# Patient Record
Sex: FEMALE | Race: WHITE | NOT HISPANIC OR LATINO | Employment: FULL TIME | URBAN - METROPOLITAN AREA
[De-identification: names, ages, dates, MRNs, and addresses within clinical notes are randomized per-mention and may not be internally consistent; named-entity substitution may affect disease eponyms.]

---

## 2018-09-01 ENCOUNTER — APPOINTMENT (INPATIENT)
Dept: NON INVASIVE DIAGNOSTICS | Facility: HOSPITAL | Age: 53
DRG: 247 | End: 2018-09-01
Attending: EMERGENCY MEDICINE
Payer: COMMERCIAL

## 2018-09-01 ENCOUNTER — HOSPITAL ENCOUNTER (INPATIENT)
Facility: HOSPITAL | Age: 53
LOS: 2 days | Discharge: HOME | DRG: 247 | End: 2018-09-03
Attending: EMERGENCY MEDICINE | Admitting: INTERNAL MEDICINE
Payer: COMMERCIAL

## 2018-09-01 ENCOUNTER — APPOINTMENT (EMERGENCY)
Dept: RADIOLOGY | Facility: HOSPITAL | Age: 53
DRG: 247 | End: 2018-09-01
Payer: COMMERCIAL

## 2018-09-01 DIAGNOSIS — I21.4 NSTEMI (NON-ST ELEVATED MYOCARDIAL INFARCTION) (HCC): Primary | ICD-10-CM

## 2018-09-01 DIAGNOSIS — I24.9 ACUTE CORONARY SYNDROME (HCC): ICD-10-CM

## 2018-09-01 LAB
ALBUMIN SERPL BCP-MCNC: 4 G/DL (ref 3.5–5)
ALP SERPL-CCNC: 90 U/L (ref 46–116)
ALT SERPL W P-5'-P-CCNC: 22 U/L (ref 12–78)
ANION GAP SERPL CALCULATED.3IONS-SCNC: 9 MMOL/L (ref 4–13)
APTT PPP: 26 SECONDS (ref 24–36)
AST SERPL W P-5'-P-CCNC: 20 U/L (ref 5–45)
ATRIAL RATE: 83 BPM
ATRIAL RATE: 84 BPM
ATRIAL RATE: 85 BPM
BASOPHILS # BLD AUTO: 0.06 THOUSANDS/ΜL (ref 0–0.1)
BASOPHILS NFR BLD AUTO: 1 % (ref 0–1)
BILIRUB SERPL-MCNC: 0.42 MG/DL (ref 0.2–1)
BUN SERPL-MCNC: 7 MG/DL (ref 5–25)
CALCIUM SERPL-MCNC: 9.4 MG/DL (ref 8.3–10.1)
CHLORIDE SERPL-SCNC: 101 MMOL/L (ref 100–108)
CHOLEST SERPL-MCNC: 296 MG/DL (ref 50–200)
CO2 SERPL-SCNC: 27 MMOL/L (ref 21–32)
CREAT SERPL-MCNC: 0.84 MG/DL (ref 0.6–1.3)
EOSINOPHIL # BLD AUTO: 0.17 THOUSAND/ΜL (ref 0–0.61)
EOSINOPHIL NFR BLD AUTO: 2 % (ref 0–6)
ERYTHROCYTE [DISTWIDTH] IN BLOOD BY AUTOMATED COUNT: 13.3 % (ref 11.6–15.1)
GFR SERPL CREATININE-BSD FRML MDRD: 81 ML/MIN/1.73SQ M
GLUCOSE SERPL-MCNC: 138 MG/DL (ref 65–140)
HCT VFR BLD AUTO: 46.3 % (ref 34.8–46.1)
HDLC SERPL-MCNC: 52 MG/DL (ref 40–60)
HGB BLD-MCNC: 15.6 G/DL (ref 11.5–15.4)
IMM GRANULOCYTES # BLD AUTO: 0.03 THOUSAND/UL (ref 0–0.2)
IMM GRANULOCYTES NFR BLD AUTO: 0 % (ref 0–2)
INR PPP: 0.99 (ref 0.86–1.17)
LDLC SERPL CALC-MCNC: 178 MG/DL (ref 0–100)
LYMPHOCYTES # BLD AUTO: 2.45 THOUSANDS/ΜL (ref 0.6–4.47)
LYMPHOCYTES NFR BLD AUTO: 26 % (ref 14–44)
MAGNESIUM SERPL-MCNC: 2.1 MG/DL (ref 1.6–2.6)
MCH RBC QN AUTO: 33.6 PG (ref 26.8–34.3)
MCHC RBC AUTO-ENTMCNC: 33.7 G/DL (ref 31.4–37.4)
MCV RBC AUTO: 100 FL (ref 82–98)
MONOCYTES # BLD AUTO: 0.55 THOUSAND/ΜL (ref 0.17–1.22)
MONOCYTES NFR BLD AUTO: 6 % (ref 4–12)
NEUTROPHILS # BLD AUTO: 6.28 THOUSANDS/ΜL (ref 1.85–7.62)
NEUTS SEG NFR BLD AUTO: 65 % (ref 43–75)
NRBC BLD AUTO-RTO: 0 /100 WBCS
P AXIS: 60 DEGREES
P AXIS: 68 DEGREES
P AXIS: 74 DEGREES
PLATELET # BLD AUTO: 206 THOUSANDS/UL (ref 149–390)
PMV BLD AUTO: 9.9 FL (ref 8.9–12.7)
POTASSIUM SERPL-SCNC: 3.8 MMOL/L (ref 3.5–5.3)
PR INTERVAL: 140 MS
PR INTERVAL: 142 MS
PR INTERVAL: 154 MS
PROT SERPL-MCNC: 8.1 G/DL (ref 6.4–8.2)
PROTHROMBIN TIME: 13.2 SECONDS (ref 11.8–14.2)
QRS AXIS: 76 DEGREES
QRS AXIS: 79 DEGREES
QRS AXIS: 80 DEGREES
QRSD INTERVAL: 68 MS
QRSD INTERVAL: 70 MS
QRSD INTERVAL: 75 MS
QT INTERVAL: 348 MS
QT INTERVAL: 352 MS
QT INTERVAL: 442 MS
QTC INTERVAL: 408 MS
QTC INTERVAL: 415 MS
QTC INTERVAL: 526 MS
RBC # BLD AUTO: 4.64 MILLION/UL (ref 3.81–5.12)
SODIUM SERPL-SCNC: 137 MMOL/L (ref 136–145)
T WAVE AXIS: 73 DEGREES
T WAVE AXIS: 78 DEGREES
T WAVE AXIS: 86 DEGREES
TRIGL SERPL-MCNC: 328 MG/DL
TROPONIN I SERPL-MCNC: 0.16 NG/ML
VENTRICULAR RATE: 83 BPM
VENTRICULAR RATE: 84 BPM
VENTRICULAR RATE: 85 BPM
WBC # BLD AUTO: 9.54 THOUSAND/UL (ref 4.31–10.16)

## 2018-09-01 PROCEDURE — 93454 CORONARY ARTERY ANGIO S&I: CPT | Performed by: INTERNAL MEDICINE

## 2018-09-01 PROCEDURE — C1894 INTRO/SHEATH, NON-LASER: HCPCS | Performed by: EMERGENCY MEDICINE

## 2018-09-01 PROCEDURE — C1887 CATHETER, GUIDING: HCPCS | Performed by: EMERGENCY MEDICINE

## 2018-09-01 PROCEDURE — 93454 CORONARY ARTERY ANGIO S&I: CPT | Performed by: EMERGENCY MEDICINE

## 2018-09-01 PROCEDURE — C1874 STENT, COATED/COV W/DEL SYS: HCPCS

## 2018-09-01 PROCEDURE — 99153 MOD SED SAME PHYS/QHP EA: CPT | Performed by: EMERGENCY MEDICINE

## 2018-09-01 PROCEDURE — 99285 EMERGENCY DEPT VISIT HI MDM: CPT

## 2018-09-01 PROCEDURE — 99223 1ST HOSP IP/OBS HIGH 75: CPT | Performed by: INTERNAL MEDICINE

## 2018-09-01 PROCEDURE — C1769 GUIDE WIRE: HCPCS | Performed by: EMERGENCY MEDICINE

## 2018-09-01 PROCEDURE — 84484 ASSAY OF TROPONIN QUANT: CPT | Performed by: EMERGENCY MEDICINE

## 2018-09-01 PROCEDURE — 85730 THROMBOPLASTIN TIME PARTIAL: CPT | Performed by: PHYSICIAN ASSISTANT

## 2018-09-01 PROCEDURE — 93005 ELECTROCARDIOGRAM TRACING: CPT

## 2018-09-01 PROCEDURE — 93010 ELECTROCARDIOGRAM REPORT: CPT | Performed by: INTERNAL MEDICINE

## 2018-09-01 PROCEDURE — 80061 LIPID PANEL: CPT | Performed by: INTERNAL MEDICINE

## 2018-09-01 PROCEDURE — C1760 CLOSURE DEV, VASC: HCPCS | Performed by: EMERGENCY MEDICINE

## 2018-09-01 PROCEDURE — 71046 X-RAY EXAM CHEST 2 VIEWS: CPT

## 2018-09-01 PROCEDURE — 99152 MOD SED SAME PHYS/QHP 5/>YRS: CPT | Performed by: EMERGENCY MEDICINE

## 2018-09-01 PROCEDURE — C9606 PERC D-E COR REVASC W AMI S: HCPCS | Performed by: EMERGENCY MEDICINE

## 2018-09-01 PROCEDURE — 027035Z DILATION OF CORONARY ARTERY, ONE ARTERY WITH TWO DRUG-ELUTING INTRALUMINAL DEVICES, PERCUTANEOUS APPROACH: ICD-10-PCS | Performed by: INTERNAL MEDICINE

## 2018-09-01 PROCEDURE — 36415 COLL VENOUS BLD VENIPUNCTURE: CPT | Performed by: EMERGENCY MEDICINE

## 2018-09-01 PROCEDURE — B2111ZZ FLUOROSCOPY OF MULTIPLE CORONARY ARTERIES USING LOW OSMOLAR CONTRAST: ICD-10-PCS | Performed by: INTERNAL MEDICINE

## 2018-09-01 PROCEDURE — 83735 ASSAY OF MAGNESIUM: CPT | Performed by: EMERGENCY MEDICINE

## 2018-09-01 PROCEDURE — C1725 CATH, TRANSLUMIN NON-LASER: HCPCS | Performed by: EMERGENCY MEDICINE

## 2018-09-01 PROCEDURE — 80053 COMPREHEN METABOLIC PANEL: CPT | Performed by: EMERGENCY MEDICINE

## 2018-09-01 PROCEDURE — 92928 PRQ TCAT PLMT NTRAC ST 1 LES: CPT | Performed by: INTERNAL MEDICINE

## 2018-09-01 PROCEDURE — 96374 THER/PROPH/DIAG INJ IV PUSH: CPT

## 2018-09-01 PROCEDURE — 85025 COMPLETE CBC W/AUTO DIFF WBC: CPT | Performed by: EMERGENCY MEDICINE

## 2018-09-01 PROCEDURE — 99152 MOD SED SAME PHYS/QHP 5/>YRS: CPT | Performed by: INTERNAL MEDICINE

## 2018-09-01 PROCEDURE — 85610 PROTHROMBIN TIME: CPT | Performed by: PHYSICIAN ASSISTANT

## 2018-09-01 RX ORDER — LIDOCAINE HYDROCHLORIDE 10 MG/ML
INJECTION, SOLUTION INFILTRATION; PERINEURAL CODE/TRAUMA/SEDATION MEDICATION
Status: COMPLETED | OUTPATIENT
Start: 2018-09-01 | End: 2018-09-01

## 2018-09-01 RX ORDER — HEPARIN SODIUM 1000 [USP'U]/ML
4000 INJECTION, SOLUTION INTRAVENOUS; SUBCUTANEOUS ONCE
Status: COMPLETED | OUTPATIENT
Start: 2018-09-01 | End: 2018-09-01

## 2018-09-01 RX ORDER — ASPIRIN 81 MG/1
324 TABLET, CHEWABLE ORAL ONCE
Status: COMPLETED | OUTPATIENT
Start: 2018-09-01 | End: 2018-09-01

## 2018-09-01 RX ORDER — NITROGLYCERIN 0.4 MG/1
0.4 TABLET SUBLINGUAL ONCE
Status: COMPLETED | OUTPATIENT
Start: 2018-09-01 | End: 2018-09-01

## 2018-09-01 RX ORDER — SODIUM CHLORIDE 9 MG/ML
INJECTION, SOLUTION INTRAVENOUS
Status: COMPLETED | OUTPATIENT
Start: 2018-09-01 | End: 2018-09-01

## 2018-09-01 RX ORDER — ONDANSETRON 2 MG/ML
INJECTION INTRAMUSCULAR; INTRAVENOUS
Status: COMPLETED
Start: 2018-09-01 | End: 2018-09-01

## 2018-09-01 RX ORDER — NITROGLYCERIN 20 MG/100ML
10 INJECTION INTRAVENOUS
Status: DISCONTINUED | OUTPATIENT
Start: 2018-09-01 | End: 2018-09-01

## 2018-09-01 RX ORDER — MIDAZOLAM HYDROCHLORIDE 1 MG/ML
INJECTION INTRAMUSCULAR; INTRAVENOUS CODE/TRAUMA/SEDATION MEDICATION
Status: COMPLETED | OUTPATIENT
Start: 2018-09-01 | End: 2018-09-01

## 2018-09-01 RX ORDER — ACETAMINOPHEN 325 MG/1
650 TABLET ORAL EVERY 6 HOURS PRN
Status: DISCONTINUED | OUTPATIENT
Start: 2018-09-01 | End: 2018-09-03 | Stop reason: HOSPADM

## 2018-09-01 RX ORDER — SODIUM CHLORIDE 9 MG/ML
125 INJECTION, SOLUTION INTRAVENOUS CONTINUOUS
Status: DISPENSED | OUTPATIENT
Start: 2018-09-01 | End: 2018-09-01

## 2018-09-01 RX ORDER — 0.9 % SODIUM CHLORIDE 0.9 %
3 VIAL (ML) INJECTION AS NEEDED
Status: DISCONTINUED | OUTPATIENT
Start: 2018-09-01 | End: 2018-09-03 | Stop reason: HOSPADM

## 2018-09-01 RX ORDER — NICOTINE 21 MG/24HR
21 PATCH, TRANSDERMAL 24 HOURS TRANSDERMAL DAILY
Status: DISCONTINUED | OUTPATIENT
Start: 2018-09-01 | End: 2018-09-03 | Stop reason: HOSPADM

## 2018-09-01 RX ORDER — NITROGLYCERIN 20 MG/100ML
INJECTION INTRAVENOUS CODE/TRAUMA/SEDATION MEDICATION
Status: COMPLETED | OUTPATIENT
Start: 2018-09-01 | End: 2018-09-01

## 2018-09-01 RX ORDER — ASPIRIN 81 MG/1
81 TABLET, CHEWABLE ORAL DAILY
Status: DISCONTINUED | OUTPATIENT
Start: 2018-09-02 | End: 2018-09-03 | Stop reason: HOSPADM

## 2018-09-01 RX ORDER — ONDANSETRON 2 MG/ML
4 INJECTION INTRAMUSCULAR; INTRAVENOUS ONCE
Status: COMPLETED | OUTPATIENT
Start: 2018-09-01 | End: 2018-09-01

## 2018-09-01 RX ORDER — FENTANYL CITRATE 50 UG/ML
INJECTION, SOLUTION INTRAMUSCULAR; INTRAVENOUS CODE/TRAUMA/SEDATION MEDICATION
Status: COMPLETED | OUTPATIENT
Start: 2018-09-01 | End: 2018-09-01

## 2018-09-01 RX ORDER — ATORVASTATIN CALCIUM 80 MG/1
80 TABLET, FILM COATED ORAL
Status: DISCONTINUED | OUTPATIENT
Start: 2018-09-01 | End: 2018-09-03

## 2018-09-01 RX ORDER — ONDANSETRON 2 MG/ML
4 INJECTION INTRAMUSCULAR; INTRAVENOUS EVERY 4 HOURS PRN
Status: DISCONTINUED | OUTPATIENT
Start: 2018-09-01 | End: 2018-09-03 | Stop reason: HOSPADM

## 2018-09-01 RX ADMIN — FENTANYL CITRATE 50 MCG: 50 INJECTION, SOLUTION INTRAMUSCULAR; INTRAVENOUS at 10:46

## 2018-09-01 RX ADMIN — LIDOCAINE HYDROCHLORIDE 10 ML: 10 INJECTION, SOLUTION INFILTRATION; PERINEURAL at 10:51

## 2018-09-01 RX ADMIN — NITROGLYCERIN 200 MCG: 20 INJECTION INTRAVENOUS at 11:15

## 2018-09-01 RX ADMIN — NITROGLYCERIN 0.4 MG: 0.4 TABLET SUBLINGUAL at 09:19

## 2018-09-01 RX ADMIN — Medication 1.75 MG/KG/HR: at 10:59

## 2018-09-01 RX ADMIN — NICOTINE 21 MG: 21 PATCH, EXTENDED RELEASE TRANSDERMAL at 13:01

## 2018-09-01 RX ADMIN — NITROGLYCERIN 200 MCG: 20 INJECTION INTRAVENOUS at 11:08

## 2018-09-01 RX ADMIN — ONDANSETRON 4 MG: 2 INJECTION INTRAMUSCULAR; INTRAVENOUS at 09:13

## 2018-09-01 RX ADMIN — MIDAZOLAM 1 MG: 1 INJECTION INTRAMUSCULAR; INTRAVENOUS at 11:10

## 2018-09-01 RX ADMIN — SODIUM CHLORIDE 125 ML/HR: 0.9 INJECTION, SOLUTION INTRAVENOUS at 13:01

## 2018-09-01 RX ADMIN — ONDANSETRON 4 MG: 2 INJECTION INTRAMUSCULAR; INTRAVENOUS at 21:31

## 2018-09-01 RX ADMIN — MIDAZOLAM 1 MG: 1 INJECTION INTRAMUSCULAR; INTRAVENOUS at 11:16

## 2018-09-01 RX ADMIN — METOPROLOL TARTRATE 25 MG: 25 TABLET ORAL at 13:01

## 2018-09-01 RX ADMIN — HEPARIN SODIUM 4000 UNITS: 1000 INJECTION, SOLUTION INTRAVENOUS; SUBCUTANEOUS at 10:04

## 2018-09-01 RX ADMIN — NITROGLYCERIN 0.4 MG: 0.4 TABLET SUBLINGUAL at 09:32

## 2018-09-01 RX ADMIN — METOPROLOL TARTRATE 25 MG: 25 TABLET ORAL at 21:31

## 2018-09-01 RX ADMIN — FENTANYL CITRATE 25 MCG: 50 INJECTION, SOLUTION INTRAMUSCULAR; INTRAVENOUS at 11:10

## 2018-09-01 RX ADMIN — IOHEXOL 200 ML: 350 INJECTION, SOLUTION INTRAVENOUS at 11:28

## 2018-09-01 RX ADMIN — NITROGLYCERIN 10 MCG/MIN: 20 INJECTION INTRAVENOUS at 10:09

## 2018-09-01 RX ADMIN — ASPIRIN 81 MG 324 MG: 81 TABLET ORAL at 09:16

## 2018-09-01 RX ADMIN — ACETAMINOPHEN 650 MG: 325 TABLET, FILM COATED ORAL at 15:39

## 2018-09-01 RX ADMIN — TICAGRELOR 180 MG: 90 TABLET ORAL at 10:02

## 2018-09-01 RX ADMIN — FENTANYL CITRATE 25 MCG: 50 INJECTION, SOLUTION INTRAMUSCULAR; INTRAVENOUS at 11:15

## 2018-09-01 RX ADMIN — MIDAZOLAM 2 MG: 1 INJECTION INTRAMUSCULAR; INTRAVENOUS at 10:46

## 2018-09-01 RX ADMIN — ATORVASTATIN CALCIUM 80 MG: 40 TABLET, FILM COATED ORAL at 15:39

## 2018-09-01 RX ADMIN — SODIUM CHLORIDE 100 ML/HR: 0.9 INJECTION, SOLUTION INTRAVENOUS at 10:52

## 2018-09-01 NOTE — ED NOTES
Pt's pain 5/10 post nitro administration  Pt's oxygen saturation also dropped to 90% on RA and placed on 2L NC and returned to 96%  Kandy Denton PA-C made aware and that pt placed on 2L NC and VO from Sergio Dawson if pt returns to 100% may remove oxygen         Lopez Crawley RN  09/01/18 8314

## 2018-09-01 NOTE — H&P
Cardiology history and Physical  [unfilled]  [phone]    09/01/18    Referring Physian: Dwight Gustafson   SLIM    Chief Complain/Reason for Referal:     IMPRESSION:    1  Acute non ST elevation myocardial infarction with ongoing chest pain  2  Tobacco abuse  3  Alcohol abuse  DISCUSSION/RECOMMENDATIONS:    Discussed with cath lab-given ongoing pain and subtle ST elevations would proceed with urgent cardiac catheterization  Brilinta load and IV heparin  IV nitroglycerin  Discussed with patient and partner risks including risk of bleeding, infection, nerve damage, ventricular arrhythmias, stroke, heart attack, death, dissection, and dye reaction  Patient and partner wish to proceed  Will observe for alcohol withdrawal      ======================================================    HPI:  I am seeing this patient in cardiology consultation for:      Canelo Bethea is a 47 y o  female with only past medical history including tobacco abuse that had acute onset of substernal chest pain this morning which felt like is somebody was sitting on chest and was associated with nausea, vomiting, diaphoresis  She has not had this pain before  She has had 2 nitroglycerin with improvement of the pain but the pain is still a 5/10  Her only medicines include Advil for mouth pain  History reviewed  No pertinent past medical history  Scheduled Meds:  Current Facility-Administered Medications:  nitroGLYcerin 10 mcg/min Intravenous Titrated Ramo Andrea MD   sodium chloride (PF) 3 mL Intravenous PRN Ramo Andrea MD   [START ON 9/2/2018] ticagrelor 90 mg Oral Q12H [facility] Ramo Andrea MD     Continuous Infusions:  nitroGLYcerin 10 mcg/min     PRN Meds:  Insert peripheral IV **AND** sodium chloride (PF)  Allergies   Allergen Reactions    Codeine GI Intolerance    Nuts Rash     I reviewed the Home Medication list in the chart  History reviewed   No pertinent family history  Social History     Social History    Marital status: Single     Spouse name: N/A    Number of children: N/A    Years of education: N/A     Occupational History    Not on file  Social History Main Topics    Smoking status: Current Every Day Smoker     Packs/day: 0 50     Types: Cigarettes    Smokeless tobacco: Never Used    Alcohol use Yes      Comment: lately drinking about 3 drinks per day after work   Maryuri Hayden Drug use: No    Sexual activity: Not on file     Other Topics Concern    Not on file     Social History Narrative    No narrative on file       Review of Systems - as per HPI, all others reviewed and negative  Vitals:    09/01/18 0939   BP: 140/86   Pulse: 81   Resp: 18   Temp:    SpO2: 94%     I/O     None        Weight (last 2 days)     Date/Time   Weight    09/01/18 0849  68 (150)              GEN: NAD, Alert  HEENT: Mucus membranes moist, pink conjunctivae  EYES: Pupils equal, sclera anicteric  NECK: No JVD/HJR, no carotid bruit  CARDIOVASCULAR: RRR, No murmur, rub, gallops S1,S2, no parasternal heave/thrill  LUNGS: Clear To auscultation bilaterally  ABDOMEN: Soft, nondistended, no hepatic systolic pulsation  EXTREMITIES/VASCULAR: No edema    PSYCH: Normal Affect by limited examination  NEURO: Grossly intact by limited examination    HEME: No significant bleeding, bruising, petechia by limited examination  SKIN: No significant rashes by limited examination  MSK:  Normal upper extremity and trunk strengths by limited examination      EKG:  Sinus rhythm with subtle ST elevations in anterolateral leads  CXR: no acute disease   ECHO:  Pending       Results from last 7 days  Lab Units 09/01/18  0856   WBC Thousand/uL 9 54   HEMOGLOBIN g/dL 15 6*   HEMATOCRIT % 46 3*   PLATELETS Thousands/uL 206   NEUTROS PCT % 65   MONOS PCT % 6       Results from last 7 days  Lab Units 09/01/18  0856   SODIUM mmol/L 137   POTASSIUM mmol/L 3 8   CHLORIDE mmol/L 101   CO2 mmol/L 27   BUN mg/dL 7 CREATININE mg/dL 0 84   CALCIUM mg/dL 9 4       Results from last 7 days  Lab Units 09/01/18  0856   SODIUM mmol/L 137   POTASSIUM mmol/L 3 8   CHLORIDE mmol/L 101   CO2 mmol/L 27   BUN mg/dL 7   CREATININE mg/dL 0 84   CALCIUM mg/dL 9 4   ALK PHOS U/L 90   ALT U/L 22   AST U/L 20     No results found for: TROPONINT        Results from last 7 days  Lab Units 09/01/18  0856   TROPONIN I ng/mL 0 16*           Results from last 7 days  Lab Units 09/01/18  0856   INR  0 99               I have personally reviewed the EKG, CXR and Telemetry images directly  There are no active problems to display for this patient  Portions of the record may have been created with voice recognition software  Occasional wrong word or "sound a like" substitutions may have occurred due to the inherent limitations of voice recognition software  Read the chart carefully and recognize, using context, where substitutions have occurred        Ahsan Beltran MD  9/1/2018 10:07 AM

## 2018-09-01 NOTE — ED NOTES
Pt left department at this time on cardiac monitor with GENE Pabon RN, Dr Vicki Mckay, and WENDY Collazo RN/hospital supervisor  Pt en route to cath lab and pt reports 0/10 pain       Lakisha Wheeler RN  09/01/18 0209

## 2018-09-01 NOTE — ED PROVIDER NOTES
History  Chief Complaint   Patient presents with    Chest Pain     pt was sitting at the table this morning and got up and developed sudden onset squeezing pain in the center of her chest   states she got sweaty and vomited   gave her a puff of his inhaler  states still having squeezing pain  denies cardiac hx       60-year-old otherwise healthy female presents emergency department for sudden onset of crushing central substernal chest pain beginning at 8 o'clock this morning (1 hr prior to arrival)  She has never experienced similar symptoms in the past   She rates the pain 6/10, nonradiating, described as someone is sitting on my chest  Pain began while she was seated at her kitchen table  There are no modifying factors  She did experience 1 episode of nausea with vomiting after pain began, however states that she attempted to use her 's albuterol inhaler which then prompted the vomiting  She also endorses shortness of breath  She has not taken any medicines for symptoms  She denies associated fever, chills, abdominal pain, dizziness, lightheadedness, vision changes, or leg swelling  No recent surgeries or prolonged immobilization  She is a current smoker  She denies known personal or family history of cardiac disease  On exam, she appears to be in pain, with left hand resting over the central chest   She is not diaphoretic, blood pressure is mildly elevated but vital signs are otherwise unremarkable  Cardiopulmonary exam is unremarkable, radial pulses are 2+ bilaterally, and she displays trace bilateral pretibial edema  Abdomen is soft and benign  Assessment/plan:  We will obtain chest pain workup, troponin, serial EKGs, chest x-ray, reassess  None       History reviewed  No pertinent past medical history  Past Surgical History:   Procedure Laterality Date    KNEE SURGERY Right        History reviewed  No pertinent family history    I have reviewed and agree with the history as documented  Social History   Substance Use Topics    Smoking status: Current Every Day Smoker     Packs/day: 0 50     Types: Cigarettes    Smokeless tobacco: Never Used    Alcohol use Yes      Comment: lately drinking about 3 drinks per day after work        Review of Systems   Constitutional: Negative for appetite change, chills, diaphoresis and fever  Eyes: Negative for visual disturbance  Respiratory: Positive for shortness of breath  Negative for cough and chest tightness  Cardiovascular: Positive for chest pain  Negative for palpitations and leg swelling  Gastrointestinal: Positive for nausea and vomiting  Negative for abdominal pain and diarrhea  Musculoskeletal: Negative for arthralgias, back pain and myalgias  Skin: Negative for color change and rash  Allergic/Immunologic: Negative for immunocompromised state  Neurological: Negative for dizziness, light-headedness and headaches  Hematological: Does not bruise/bleed easily  Psychiatric/Behavioral: Negative for confusion  The patient is nervous/anxious  Physical Exam  Physical Exam   Constitutional: She is oriented to person, place, and time  She appears well-developed and well-nourished  She appears distressed  HENT:   Head: Normocephalic and atraumatic  Mouth/Throat: Oropharynx is clear and moist    Eyes: Pupils are equal, round, and reactive to light  No scleral icterus  Neck: No JVD present  Cardiovascular: Normal rate and regular rhythm  Exam reveals no gallop and no friction rub  No murmur heard  Pulmonary/Chest: No respiratory distress  She has no wheezes  She has no rales  Chest pain is not reproducible on exam  Normal chest wall excursion on inspiration, no deformity or crepitus   Abdominal: Soft  Bowel sounds are normal  She exhibits no distension and no mass  There is no tenderness  There is no guarding  Neurological: She is alert and oriented to person, place, and time     Skin: Skin is warm  Capillary refill takes less than 2 seconds  She is diaphoretic  Psychiatric: She has a normal mood and affect  Her behavior is normal    Vitals reviewed        Vital Signs  ED Triage Vitals [09/01/18 0849]   Temperature Pulse Respirations Blood Pressure SpO2   98 3 °F (36 8 °C) 84 18 (!) 154/113 97 %      Temp Source Heart Rate Source Patient Position - Orthostatic VS BP Location FiO2 (%)   Oral -- -- -- --      Pain Score       6           Vitals:    09/01/18 0925 09/01/18 0939 09/01/18 1008 09/01/18 1030   BP: 131/82 140/86 136/85 133/86   Pulse: 90 81 90 88       Visual Acuity      ED Medications  Medications   sodium chloride (PF) 0 9 % injection 3 mL (not administered)   ticagrelor (BRILINTA) tablet 180 mg (180 mg Oral Given 9/1/18 1002)     And   ticagrelor (BRILINTA) tablet 90 mg (not administered)   nitroGLYcerin (TRIDIL) 50 mg in 250 mL infusion (premix) (0 mcg/min Intravenous Stopped 9/1/18 1122)   midazolam (VERSED) injection (1 mg Intravenous Given 9/1/18 1116)   fentanyl citrate (PF) 100 MCG/2ML (25 mcg Intravenous Given 9/1/18 1115)   lidocaine (XYLOCAINE) 1 % injection (10 mL Infiltration Given 9/1/18 1051)   sodium chloride 0 9 % infusion (100 mL/hr Intravenous New Bag 9/1/18 1052)   bivalirudin (ANGIOMAX) bolus from bag (10 5 mL Intravenous Given 9/1/18 1059)   Bivalirudin Trifluoroacetate (ANGIOMAX) 250 mg in sodium chloride 0 9 % 50 mL infusion (1 75 mg/kg/hr × 68 kg Intravenous New Bag 9/1/18 1059)   nitroGLYcerin (TRIDIL) 50 mg in 250 mL infusion (premix) (200 mcg Other Given 9/1/18 1115)   nitroglycerin (NITROSTAT) SL tablet 0 4 mg (0 4 mg Sublingual Given 9/1/18 0919)   aspirin chewable tablet 324 mg (324 mg Oral Given 9/1/18 0916)   ondansetron (ZOFRAN) injection 4 mg (4 mg Intravenous Given 9/1/18 0913)   nitroglycerin (NITROSTAT) SL tablet 0 4 mg (0 4 mg Sublingual Given 9/1/18 0958)   heparin (porcine) injection 4,000 Units (4,000 Units Intravenous Given 9/1/18 1004) Diagnostic Studies  Results Reviewed     Procedure Component Value Units Date/Time    Magnesium [[id number]]  (Normal) Collected:  09/01/18 0856    Lab Status:  Final result Specimen:  Blood from Arm, Right Updated:  09/01/18 1024     Magnesium 2 1 mg/dL     Protime-INR [[id number]]  (Normal) Collected:  09/01/18 0856    Lab Status:  Final result Specimen:  Blood from Arm, Right Updated:  09/01/18 0948     Protime 13 2 seconds      INR 0 99    APTT [[id number]]  (Normal) Collected:  09/01/18 0856    Lab Status:  Final result Specimen:  Blood from Arm, Right Updated:  09/01/18 0948     PTT 26 seconds     Troponin I [[id number]]  (Abnormal) Collected:  09/01/18 0856    Lab Status:  Final result Specimen:  Blood from Arm, Right Updated:  09/01/18 0920     Troponin I 0 16 (H) ng/mL     Comprehensive metabolic panel [[id number]] Collected:  09/01/18 0856    Lab Status:  Final result Specimen:  Blood from Arm, Right Updated:  09/01/18 0916     Sodium 137 mmol/L      Potassium 3 8 mmol/L      Chloride 101 mmol/L      CO2 27 mmol/L      ANION GAP 9 mmol/L      BUN 7 mg/dL      Creatinine 0 84 mg/dL      Glucose 138 mg/dL      Calcium 9 4 mg/dL      AST 20 U/L      ALT 22 U/L      Alkaline Phosphatase 90 U/L      Total Protein 8 1 g/dL      Albumin 4 0 g/dL      Total Bilirubin 0 42 mg/dL      eGFR 81 ml/min/1 73sq m     Narrative:         National Kidney Disease Education Program recommendations are as follows:  GFR calculation is accurate only with a steady state creatinine  Chronic Kidney disease less than 60 ml/min/1 73 sq  meters  Kidney failure less than 15 ml/min/1 73 sq  meters      CBC and differential [[id number]]  (Abnormal) Collected:  09/01/18 0856    Lab Status:  Final result Specimen:  Blood from Arm, Right Updated:  09/01/18 0903     WBC 9 54 Thousand/uL      RBC 4 64 Million/uL      Hemoglobin 15 6 (H) g/dL      Hematocrit 46 3 (H) %       (H) fL      MCH 33 6 pg      MCHC 33 7 g/dL      RDW 13 3 %      MPV 9 9 fL      Platelets 970 Thousands/uL      nRBC 0 /100 WBCs      Neutrophils Relative 65 %      Immat GRANS % 0 %      Lymphocytes Relative 26 %      Monocytes Relative 6 %      Eosinophils Relative 2 %      Basophils Relative 1 %      Neutrophils Absolute 6 28 Thousands/µL      Immature Grans Absolute 0 03 Thousand/uL      Lymphocytes Absolute 2 45 Thousands/µL      Monocytes Absolute 0 55 Thousand/µL      Eosinophils Absolute 0 17 Thousand/µL      Basophils Absolute 0 06 Thousands/µL                  X-ray chest 2 views   ED Interpretation by Duy Palafox PA-C (09/01 5421)   No acute infiltrate or effusion, normal cardiac silhouette      Final Result by Eloy Gregory MD (09/01 9301)      No acute cardiopulmonary disease              Workstation performed: [id number]                    Procedures  ECG 12 Lead Documentation  Date/Time: 9/1/2018 8:50 AM  Performed by: Brittanie Marks  Authorized by: Brittanie Marks     Indications / Diagnosis:  Chest pain  ECG reviewed by me, the ED Provider: yes    Patient location:  ED  Previous ECG:     Previous ECG:  Unavailable    Comparison to cardiac monitor: Yes    Interpretation:     Interpretation: non-specific    Rate:     ECG rate:  84    ECG rate assessment: normal    Rhythm:     Rhythm: sinus rhythm    Ectopy:     Ectopy: none    QRS:     QRS axis:  Normal    QRS intervals:  Normal  ST segments:     ST segments:  Non-specific    Elevation:  V3, V4, V5 and V6  T waves:     T waves: normal    ECG 12 Lead Documentation  Date/Time: 9/1/2018 9:15 AM  Performed by: Desiree Dang by: Brittanie Marks     Indications / Diagnosis:  Chest pain  ECG reviewed by me, the ED Provider: yes    Patient location:  ED  Previous ECG:     Previous ECG:  Compared to current    Similarity:  No change  Interpretation:     Interpretation: non-specific    Rate:     ECG rate:  83    ECG rate assessment: normal    Rhythm:     Rhythm: sinus rhythm    Ectopy:     Ectopy: none    QRS:     QRS axis: Normal    QRS intervals:  Normal  Conduction:     Conduction: normal    ST segments:     ST segments:  Non-specific    Elevation:  V3, V4, V5 and V6  T waves:     T waves: normal             Phone Contacts  ED Phone Contact    ED Course  ED Course as of Sep 01 1125   Sat Sep 01, 2018   3537 Repeat EKG without changes from initial  Troponin elevated  Cardiology paged  Troponin I: (!) 0 16   [id number] Spoke with cardiology on call  Recommends another dose NTG, will see patient in the ED      0935 Dr Angely Ge and Cardiology Dr Radha Johansen at bedside  Plans to take pt to cath lab  Will start nitro drip and Brillinta  Delay to PCI due to lack of EKG changes meeting STEMI criteria          HEART Risk Score      Most Recent Value   History  2 Filed at: 09/01/2018 0856   ECG  1 Filed at: 09/01/2018 0856   Age  1 Filed at: 09/01/2018 0856   Risk Factors  1 Filed at: 09/01/2018 0856   Troponin  1 Filed at: 09/01/2018 0856   Heart Score Risk Calculator   History  2 Filed at: 09/01/2018 0856   ECG  1 Filed at: 09/01/2018 0856   Age  1 Filed at: 09/01/2018 0856   Risk Factors  1 Filed at: 09/01/2018 0856   Troponin  1 Filed at: 09/01/2018 0856   HEART Score  6 Filed at: 09/01/2018 0856   HEART Score  6 Filed at: 09/01/2018 5035                            MDM  Number of Diagnoses or Management Options  Acute coronary syndrome [facility]): new and requires workup  NSTEMI (non-ST elevated myocardial infarction) [facility]): new and requires workup  Diagnosis management comments: 41-year-old female presents emergency department for evaluation of acute onset central, crushing chest pain to the substernal region beginning at 8 o'clock this morning, approximately 45 min prior to arrival   She is found to be diaphoretic and significantly distressed exam, initial EKG with nonspecific ST changes leads V 3 through V6, repeat EKG at 15 min unchanged  Patient found to have elevated troponin    Case discussed with Dr Acuna, [facility] cardiology, who plans to take the patient to the cardiac cath lab  Delayed to PCI due to lack of significant EKG changes meeting STEMI criteria  Patient started on nitroglycerin drip as well as given Brilinta in the emergency department prior to admission  Amount and/or Complexity of Data Reviewed  Clinical lab tests: ordered and reviewed  Tests in the radiology section of CPT®: ordered and reviewed  Tests in the medicine section of CPT®: ordered and reviewed  Review and summarize past medical records: yes  Discuss the patient with other providers: yes  Independent visualization of images, tracings, or specimens: yes    Risk of Complications, Morbidity, and/or Mortality  Presenting problems: high  Diagnostic procedures: high  Management options: high    Patient Progress  Patient progress: stable    CritCare Time    Disposition  Final diagnoses:   Acute coronary syndrome ([place] [address] )   NSTEMI (non-ST elevated myocardial infarction) ([place] [address] )     Time reflects when diagnosis was documented in both MDM as applicable and the Disposition within this note     Time User Action Codes Description Comment    9/1/2018  9:30 AM Alessio Close Add [I24 9] Acute coronary syndrome ([place] [address] )     9/1/2018  9:51 AM Alessio Close Add [I21 4] NSTEMI (non-ST elevated myocardial infarction) ([place] [address] )     9/1/2018  9:51 AM Alessio Close Modify [I24 9] Acute coronary syndrome ([place] [address] )     9/1/2018  9:51 AM Alessio Close Modify [I21 4] NSTEMI (non-ST elevated myocardial infarction) [facility])       ED Disposition     ED Disposition Condition Comment    Admit  Case was discussed with Dr Xochilt Jackson and the patient's admission status was agreed to be Admission Status: inpatient status to the service of Dr Xochilt Jackson   Follow-up Information    None         Patient's Medications    No medications on file     No discharge procedures on file      ED Provider  Electronically Signed by           Cici Harrison PA-C  09/01/18 6323

## 2018-09-01 NOTE — BRIEF OP NOTE (RAD/CATH)
Cardiac catheterization  Indication:  Non ST segment elevation MI type 1 with ongoing chest discomfort  Findings:  High-grade mid LAD disease with 95% obstruction  40% obstruction more distal distally  Nonobstructive disease in the left main circumflex and right coronary artery  Procedure:  Drug-eluting stent placement x2 in the mid LAD with 2 25 Xience MIKE 23 mm and 8 mm with post dilatation by 2 25 x 15 NC balloon  Excellent result  Angiomax, Brilinta and aspirin used during procedure  Recommendations:  Dual anti-platelet therapy for 1 year  Check 2D echocardiogram for left ventricular systolic function  Beta-blocker as tolerated  Smoking cessation  Statin

## 2018-09-01 NOTE — ED ATTENDING ATTESTATION
Abdifatah Castorena MD, saw and evaluated the patient  I have discussed the patient with the resident/non-physician practitioner and agree with the resident's/non-physician practitioner's findings, Plan of Care, and MDM as documented in the resident's/non-physician practitioner's note, except where noted  All available labs and Radiology studies were reviewed  At this point I agree with the current assessment done in the Emergency Department  I have conducted an independent evaluation of this patient a history and physical is as follows:    Chest pain that started at 8 o'clock this morning with some shortness of breath and diaphoresis  Had nondiagnostic EKG at least not meeting criteria for STEMI but had some subtle ST elevation in V3 through V5  EKG was repeated with no change  Patient still having pain  Troponin was elevated  Cardiology was consulted and will take the patient to the cardiac catheterization laboratory  Delay to PCI was secondary to the patient having a nondiagnostic EKG and did not meet criteria for MI alert activation  Heparin, nitro and Brilinta were ordered  Patient had aspirin  Critical Care Time  The patient presented with a condition in which there was a high probability of imminent or life-threatening deterioration, and critical care services (excluding separately billable procedures) totalled 30-74 minutes          CriticalCare Time  Performed by: Neil Just by: [facility] provider statement:     Critical care time (minutes):  40    Critical care time was exclusive of:  Separately billable procedures and treating other patients and teaching time    Critical care was necessary to treat or prevent imminent or life-threatening deterioration of the following conditions:  Cardiac failure (NSTEMI with the patient going to the catheterization laboratory )    Critical care was time spent personally by me on the following activities:  Obtaining history from patient or surrogate, development of treatment plan with patient or surrogate, discussions with consultants, evaluation of patient's response to treatment, examination of patient, re-evaluation of patient's condition, ordering and review of radiographic studies and ordering and review of laboratory studies    I assumed direction of critical care for this patient from another provider in my specialty: no

## 2018-09-01 NOTE — ED NOTES
Pt returned from x-ray and began vomiting again  Rashmi Dunn PA-C notified and will order Zofran for pt       Vanda Hernandez RN  09/01/18 6055

## 2018-09-02 ENCOUNTER — APPOINTMENT (INPATIENT)
Dept: NON INVASIVE DIAGNOSTICS | Facility: HOSPITAL | Age: 53
DRG: 247 | End: 2018-09-02
Payer: COMMERCIAL

## 2018-09-02 LAB
ANION GAP SERPL CALCULATED.3IONS-SCNC: 12 MMOL/L (ref 4–13)
BUN SERPL-MCNC: 6 MG/DL (ref 5–25)
CALCIUM SERPL-MCNC: 8.2 MG/DL (ref 8.3–10.1)
CHLORIDE SERPL-SCNC: 103 MMOL/L (ref 100–108)
CO2 SERPL-SCNC: 21 MMOL/L (ref 21–32)
CREAT SERPL-MCNC: 0.69 MG/DL (ref 0.6–1.3)
ERYTHROCYTE [DISTWIDTH] IN BLOOD BY AUTOMATED COUNT: 13.5 % (ref 11.6–15.1)
GFR SERPL CREATININE-BSD FRML MDRD: 101 ML/MIN/1.73SQ M
GLUCOSE SERPL-MCNC: 127 MG/DL (ref 65–140)
HCT VFR BLD AUTO: 39.5 % (ref 34.8–46.1)
HGB BLD-MCNC: 13.3 G/DL (ref 11.5–15.4)
MCH RBC QN AUTO: 33.9 PG (ref 26.8–34.3)
MCHC RBC AUTO-ENTMCNC: 33.7 G/DL (ref 31.4–37.4)
MCV RBC AUTO: 101 FL (ref 82–98)
PLATELET # BLD AUTO: 173 THOUSANDS/UL (ref 149–390)
PMV BLD AUTO: 10.4 FL (ref 8.9–12.7)
POTASSIUM SERPL-SCNC: 4.2 MMOL/L (ref 3.5–5.3)
RBC # BLD AUTO: 3.92 MILLION/UL (ref 3.81–5.12)
SODIUM SERPL-SCNC: 136 MMOL/L (ref 136–145)
TROPONIN I SERPL-MCNC: 11.05 NG/ML
WBC # BLD AUTO: 11.28 THOUSAND/UL (ref 4.31–10.16)

## 2018-09-02 PROCEDURE — 85027 COMPLETE CBC AUTOMATED: CPT | Performed by: INTERNAL MEDICINE

## 2018-09-02 PROCEDURE — 93306 TTE W/DOPPLER COMPLETE: CPT | Performed by: INTERNAL MEDICINE

## 2018-09-02 PROCEDURE — 99233 SBSQ HOSP IP/OBS HIGH 50: CPT | Performed by: INTERNAL MEDICINE

## 2018-09-02 PROCEDURE — 93306 TTE W/DOPPLER COMPLETE: CPT

## 2018-09-02 PROCEDURE — 80048 BASIC METABOLIC PNL TOTAL CA: CPT | Performed by: INTERNAL MEDICINE

## 2018-09-02 PROCEDURE — 84484 ASSAY OF TROPONIN QUANT: CPT | Performed by: INTERNAL MEDICINE

## 2018-09-02 RX ORDER — METOPROLOL TARTRATE 50 MG/1
50 TABLET, FILM COATED ORAL EVERY 12 HOURS SCHEDULED
Status: DISCONTINUED | OUTPATIENT
Start: 2018-09-02 | End: 2018-09-03 | Stop reason: HOSPADM

## 2018-09-02 RX ORDER — LISINOPRIL 2.5 MG/1
2.5 TABLET ORAL DAILY
Status: DISCONTINUED | OUTPATIENT
Start: 2018-09-02 | End: 2018-09-03 | Stop reason: HOSPADM

## 2018-09-02 RX ADMIN — TICAGRELOR 90 MG: 90 TABLET ORAL at 21:21

## 2018-09-02 RX ADMIN — METOPROLOL TARTRATE 50 MG: 50 TABLET ORAL at 21:21

## 2018-09-02 RX ADMIN — ATORVASTATIN CALCIUM 80 MG: 40 TABLET, FILM COATED ORAL at 18:18

## 2018-09-02 RX ADMIN — METOPROLOL TARTRATE 50 MG: 50 TABLET ORAL at 08:32

## 2018-09-02 RX ADMIN — TICAGRELOR 90 MG: 90 TABLET ORAL at 08:32

## 2018-09-02 RX ADMIN — ENOXAPARIN SODIUM 40 MG: 40 INJECTION SUBCUTANEOUS at 08:32

## 2018-09-02 RX ADMIN — ASPIRIN 81 MG 81 MG: 81 TABLET ORAL at 08:32

## 2018-09-02 RX ADMIN — LISINOPRIL 2.5 MG: 2.5 TABLET ORAL at 18:18

## 2018-09-02 RX ADMIN — ACETAMINOPHEN 650 MG: 325 TABLET, FILM COATED ORAL at 04:49

## 2018-09-02 NOTE — PLAN OF CARE
Problem: DISCHARGE PLANNING - CARE MANAGEMENT  Goal: Discharge to post-acute care or home with appropriate resources  INTERVENTIONS:  - Conduct assessment to determine patient/family and health care team treatment goals, and need for post-acute services based on payer coverage, community resources, and patient preferences, and barriers to discharge  - Address psychosocial, clinical, and financial barriers to discharge as identified in assessment in conjunction with the patient/family and health care team  - Arrange appropriate level of post-acute services according to patients   needs and preference and payer coverage in collaboration with the physician and health care team  - Communicate with and update the patient/family, physician, and health care team regarding progress on the discharge plan  - Arrange appropriate transportation to post-acute venues  Outcome: Progressing  CM covering for Sunday received a consult for Jonathan  RN report pt will be discharge tomorrow  Put savings card for Brilinta in patients binder with her name on it  RN is aware of this  Pt has commercially insurance and can use the Brilinta savings card

## 2018-09-02 NOTE — PROGRESS NOTES
Progress Note - Cardiology   Cassy Porter 47 y o  female MRN: [id number]  Unit/Bed#: ICU 10 Encounter: [id number]      Assessment: 1  Acute non ST elevation myocardial infarction status post drug-eluting stenting x2 in the mid left anterior descending with excellent result  2  Tobacco abuse  3  Alcohol abuse    Discussion/Recommendations:    Continue beta-blocker/aspirin/Brilinta/statin  Check echocardiogram     Tobacco cessation  Case management consultation for Brilinta coverage  Probable discharge tomorrow  Subjective:  No chest pain-wants to go home        Physical Exam:  GEN:  NAD  HEENT:  MMM, NCAT, pink conjunctiva, EOMI, nonicteric sclera  CV:  NO JVD/HJR, RR, NO M/R/G, +S1/S2, NO PARASTERNAL HEAVE/THRILL, NO LE EDEMA, NO HEPATIC SYSTOLIC PULSATION, WARM EXTREMITIES  RESP:  CTAB/L  ABD:  SOFT, NT, NO GROSS ORGANOMEGALY        Vitals:   /83   Pulse 90   Temp 98 8 °F (37 1 °C) (Temporal)   Resp (!) 24   Ht 5' 3" (1 6 m)   Wt 77 5 kg (170 lb 13 7 oz)   SpO2 93%   BMI 30 27 kg/m²   Vitals:    09/01/18 0849 09/01/18 1213   Weight: 68 kg (150 lb) 77 5 kg (170 lb 13 7 oz)       Intake/Output Summary (Last 24 hours) at 09/02/18 0752  Last data filed at 09/02/18 0501   Gross per 24 hour   Intake          1622 92 ml   Output             1200 ml   Net           422 92 ml         TELEMETRY:  Brief probable SVT for few beats  Lab Results:    Results from last 7 days  Lab Units 09/02/18  0447   WBC Thousand/uL 11 28*   HEMOGLOBIN g/dL 13 3   HEMATOCRIT % 39 5   PLATELETS Thousands/uL 173       Results from last 7 days  Lab Units 09/02/18  0447 09/01/18  0856   SODIUM mmol/L 136 137   POTASSIUM mmol/L 4 2 3 8   CHLORIDE mmol/L 103 101   CO2 mmol/L 21 27   BUN mg/dL 6 7   CREATININE mg/dL 0 69 0 84   CALCIUM mg/dL 8 2* 9 4   ALK PHOS U/L  --  90   ALT U/L  --  22   AST U/L  --  20       Results from last 7 days  Lab Units 09/02/18  0447   SODIUM mmol/L 136   POTASSIUM mmol/L 4 2   CHLORIDE mmol/L 103   CO2 mmol/L 21   BUN mg/dL 6   CREATININE mg/dL 0 69   CALCIUM mg/dL 8 2*             Medications:    Current Facility-Administered Medications:     acetaminophen (TYLENOL) tablet 650 mg, 650 mg, Oral, Q6H PRN, Kehinde Shipman MD, 650 mg at 09/02/18 0449    aspirin chewable tablet 81 mg, 81 mg, Oral, Daily, Kehinde Shipman MD    atorvastatin (LIPITOR) tablet 80 mg, 80 mg, Oral, Daily With Elda Leggett MD, 80 mg at 09/01/18 1539    enoxaparin (LOVENOX) subcutaneous injection 40 mg, 40 mg, Subcutaneous, BID, Kehinde Shipman MD    enoxaparin (LOVENOX) subcutaneous injection 40 mg, 40 mg, Subcutaneous, Q24H [address], Kehinde Shipman MD    metoprolol tartrate (LOPRESSOR) tablet 25 mg, 25 mg, Oral, Q12H [address], Kehinde Shipman MD, 25 mg at 09/01/18 2131    nicotine (NICODERM CQ) 21 mg/24 hr TD 24 hr patch 21 mg, 21 mg, Transdermal, Daily, Kehinde Shipman MD, 21 mg at 09/01/18 1301    ondansetron (ZOFRAN) injection 4 mg, 4 mg, Intravenous, Q4H PRN, SARAH Guthrie, 4 mg at 09/01/18 2131    Insert peripheral IV, , , Once **AND** sodium chloride (PF) 0 9 % injection 3 mL, 3 mL, Intravenous, PRN, Zeus Xiong MD    [COMPLETED] ticagrelor [facility]) tablet 180 mg, 180 mg, Oral, Once, 180 mg at 09/01/18 1002 **AND** ticagrelor (BRILINTA) tablet 90 mg, 90 mg, Oral, Q12H [address], Zeus Xiong MD    Portions of the record may have been created with voice recognition software  Occasional wrong word or "sound a like" substitutions may have occurred due to the inherent limitations of voice recognition software  Read the chart carefully and recognize, using context, where substitutions have occurred

## 2018-09-02 NOTE — SOCIAL WORK
CM covering for Sunday received a consult for Jonathan  RN report pt will be discharge tomorrow  Put savings card for Brilinta in patients binder with her name on it  RN is aware of this  Pt has commercially insurance and can use the Brilinta savings card

## 2018-09-02 NOTE — CASE MANAGEMENT
Initial Clinical Review    Admission: Date/Time/Statement: 9/1/18 @ 0950     Orders Placed This Encounter   Procedures    Inpatient Admission (expected length of stay for this patient is greater than two midnights)     Standing Status:   Standing     Number of Occurrences:   1     Order Specific Question:   Admitting Physician     Answer:   Dieter Shi [[id number]]     Order Specific Question:   Level of Care     Answer:   Critical Care [15]     Order Specific Question:   Estimated length of stay     Answer:   More than 2 Midnights     Order Specific Question:   Certification     Answer:   I certify that inpatient services are medically necessary for this patient for a duration of greater than two midnights  See H&P and MD Progress Notes for additional information about the patient's course of treatment  ED: Date/Time/Mode of Arrival:   ED Arrival Information     Expected Arrival Acuity Means of Arrival Escorted By Service Admission Type    - 9/1/2018 08:42 Emergent Wheelchair Family Member Cardiology Emergency    Arrival Complaint    Chest pain          Chief Complaint:   Chief Complaint   Patient presents with    Chest Pain     pt was sitting at the table this morning and got up and developed sudden onset squeezing pain in the center of her chest   states she got sweaty and vomited   gave her a puff of his inhaler  states still having squeezing pain  denies cardiac hx  History of Illness: Cassy Porter is a 47 y o  female with only past medical history including tobacco abuse that had acute onset of substernal chest pain this morning which felt like is somebody was sitting on chest and was associated with nausea, vomiting, diaphoresis      She has not had this pain before      She has had 2 nitroglycerin with improvement of the pain but the pain is still a 5/10      ED Vital Signs:   ED Triage Vitals [09/01/18 0849]   Temperature Pulse Respirations Blood Pressure SpO2   98 3 °F (36 8 °C) 84 18 (!) 154/113 97 %      Temp Source Heart Rate Source Patient Position - Orthostatic VS BP Location FiO2 (%)   Oral -- -- -- --      Pain Score       6        Wt Readings from Last 1 Encounters:   09/01/18 77 5 kg (170 lb 13 7 oz)       Vital Signs (abnormal):    above    Abnormal Labs/Diagnostic Test Results:    Troponin    0 16  CXR:  NAD    ED Treatment:   Medication Administration from 09/01/2018 0842 to 09/01/2018 1150       Date/Time Order Dose Route Action Action by Comments     09/01/2018 0919 nitroglycerin (NITROSTAT) SL tablet 0 4 mg 0 4 mg Sublingual Given David Falcon RN      09/01/2018 4594 aspirin chewable tablet 324 mg 324 mg Oral Given David Falcon RN      09/01/2018 0913 ondansetron (ZOFRAN) injection 4 mg 4 mg Intravenous Given David Falcon RN      09/01/2018 0932 nitroglycerin (NITROSTAT) SL tablet 0 4 mg 0 4 mg Sublingual Given David Falcon RN      09/01/2018 1004 heparin (porcine) injection 4,000 Units 4,000 Units Intravenous Given David Falcon RN      09/01/2018 1002 ticagrelor (BRILINTA) tablet 180 mg 180 mg Oral Given David Falcon RN      09/01/2018 1122 nitroGLYcerin (TRIDIL) 50 mg in 250 mL infusion (premix) 0 mcg/min Intravenous Stopped Shira Mcduffie RN      09/01/2018 1009 nitroGLYcerin (TRIDIL) 50 mg in 250 mL infusion (premix) 10 mcg/min Intravenous New Bag David Falcon RN double RN check completed with Elieser France RN and GENE Pabon RN     09/01/2018 1116 midazolam (VERSED) injection 1 mg Intravenous Given Edward Ramirez RN      09/01/2018 1110 midazolam (VERSED) injection 1 mg Intravenous Given Edward Ramirez RN      09/01/2018 1046 midazolam (VERSED) injection 2 mg Intravenous Given Edward Ramirez RN      09/01/2018 1115 fentanyl citrate (PF) 100 MCG/2ML 25 mcg Intravenous Given Edward Ramirez RN      09/01/2018 1110 fentanyl citrate (PF) 100 MCG/2ML 25 mcg Intravenous Given Edward Ramirez RN      09/01/2018 1046 fentanyl citrate (PF) 100 MCG/2ML 50 mcg Intravenous Given Rozann Favre, RN      09/01/2018 1051 lidocaine (XYLOCAINE) 1 % injection 10 mL Infiltration Given Mary Narayan MD      09/01/2018 1052 sodium chloride 0 9 % infusion 100 mL/hr Intravenous New Bag Stephaniederik Beatty RN      09/01/2018 1059 bivalirudin (ANGIOMAX) bolus from bag 10 5 mL Intravenous Given Rozann Favre, RN      09/01/2018 1128 Bivalirudin Trifluoroacetate (ANGIOMAX) 250 mg in sodium chloride 0 9 % 50 mL infusion 0 mg/kg/hr Intravenous Stopped Shai Zhao RN      09/01/2018 1059 Bivalirudin Trifluoroacetate (ANGIOMAX) 250 mg in sodium chloride 0 9 % 50 mL infusion 1 75 mg/kg/hr Intravenous [address] Rozann Favre, RN      09/01/2018 1115 nitroGLYcerin (TRIDIL) 50 mg in 250 mL infusion (premix) 200 mcg Other Given Mary Narayan MD      09/01/2018 1108 nitroGLYcerin (TRIDIL) 50 mg in 250 mL infusion (premix) 200 mcg Other Given Mary Narayan MD      09/01/2018 1128 iohexol (OMNIPAQUE) 350 MG/ML injection (SINGLE-DOSE) 200 mL Intravenous Given Mary Narayan MD [place]          Past Medical/Surgical History: Active Ambulatory Problems     Diagnosis Date Noted    No Active Ambulatory Problems     Resolved Ambulatory Problems     Diagnosis Date Noted    No Resolved Ambulatory Problems     No Additional Past Medical History       Admitting Diagnosis: Chest pain [R07 9]  Acute coronary syndrome (HCC) [I24 9]  NSTEMI (non-ST elevated myocardial infarction) ([facility] [address] ) [I21 4]    Age/Sex: 47 y o  female    Assessment/Plan:   Acute non ST elevation myocardial infarction with ongoing chest pain  2  Tobacco abuse  3  Alcohol abuse  Discussed with cath lab-given ongoing pain and subtle ST elevations would proceed with urgent cardiac catheterization      Brilinta load and IV heparin    IV nitroglycerin      Discussed with patient and partner risks including risk of bleeding, infection, nerve damage, ventricular arrhythmias, stroke, heart attack, death, dissection, and dye reaction      Patient and partner wish to proceed      Will observe for alcohol withdrawal         Admission Orders:   IP  9/1  @   0950  Scheduled Meds:   Current Facility-Administered Medications:  acetaminophen 650 mg Oral Q6H PRN Kehinde Shipman MD   aspirin 81 mg Oral Daily Kehinde Shipman MD   atorvastatin 80 mg Oral Daily With Leonardo Dave MD   enoxaparin 40 mg Subcutaneous BID Kehinde Shipman MD   enoxaparin 40 mg Subcutaneous Q24H [address] Kehinde Shipman MD   metoprolol tartrate 50 mg Oral Q12H [address] Kehinde Shipman MD   nicotine 21 mg Transdermal Daily Kehinde Shipman MD   ondansetron 4 mg Intravenous Q4H PRN SARAH Guthrie   sodium chloride (PF) 3 mL Intravenous PRN Zeus Xiong MD   ticagrelor 90 mg Oral Q12H [address] Zeus Xiong MD     Continuous Infusions:    PRN Meds:   acetaminophen    ondansetron    Insert peripheral IV **AND** sodium chloride (PF)     Cardiac  Diet  2 DE    CARDIAC  CATH  9/1  Non ST segment elevation MI type 1 with ongoing chest discomfort      Findings:  High-grade mid LAD disease with 95% obstruction  40% obstruction more distal distally  Nonobstructive disease in the left main circumflex and right coronary artery      Procedure:  Drug-eluting stent placement x2 in the mid LAD with 2 25 Xience MIKE 23 mm and 8 mm with post dilatation by 2 25 x 15 NC balloon  Excellent result  Angiomax, Brilinta and aspirin used during procedure      Recommendations:  Dual anti-platelet therapy for 1 year  Check 2D echocardiogram for left ventricular systolic function  Beta-blocker as tolerated  Smoking cessation  Statin  PROGRESS  NOTE  9/2  Acute non ST elevation myocardial infarction status post drug-eluting stenting x2 in the mid left anterior descending with excellent result  2   Tobacco abuse  3  Alcohol abuse     Discussion/Recommendations:     Continue beta-blocker/aspirin/Brilinta/statin      Check echocardiogram      Tobacco cessation      Thank you,  [facility]  Utilization Review Department  Phone: [phone]; Fax [phone]  ATTENTION: Please call with any questions or concerns to [phone]  and carefully follow the prompts so that you are directed to the right person  Send all requests for admission clinical reviews, approved or denied determinations and any other requests to fax [phone]   All voicemails are confidential

## 2018-09-03 VITALS
DIASTOLIC BLOOD PRESSURE: 68 MMHG | HEART RATE: 81 BPM | TEMPERATURE: 97 F | RESPIRATION RATE: 18 BRPM | SYSTOLIC BLOOD PRESSURE: 107 MMHG | WEIGHT: 170.86 LBS | OXYGEN SATURATION: 94 % | HEIGHT: 63 IN | BODY MASS INDEX: 30.27 KG/M2

## 2018-09-03 PROBLEM — I21.4 NSTEMI (NON-ST ELEVATED MYOCARDIAL INFARCTION) (HCC): Status: ACTIVE | Noted: 2018-09-03

## 2018-09-03 PROCEDURE — 99238 HOSP IP/OBS DSCHRG MGMT 30/<: CPT | Performed by: INTERNAL MEDICINE

## 2018-09-03 RX ORDER — ROSUVASTATIN CALCIUM 20 MG/1
20 TABLET, COATED ORAL
Qty: 30 TABLET | Refills: 5 | Status: SHIPPED | OUTPATIENT
Start: 2018-09-03 | End: 2018-09-03

## 2018-09-03 RX ORDER — ASPIRIN 81 MG/1
81 TABLET, CHEWABLE ORAL DAILY
Qty: 30 TABLET | Refills: 5 | Status: SHIPPED | OUTPATIENT
Start: 2018-09-03

## 2018-09-03 RX ORDER — METOPROLOL TARTRATE 50 MG/1
50 TABLET, FILM COATED ORAL EVERY 12 HOURS SCHEDULED
Qty: 60 TABLET | Refills: 5 | Status: SHIPPED | OUTPATIENT
Start: 2018-09-03 | End: 2018-09-03

## 2018-09-03 RX ORDER — LISINOPRIL 2.5 MG/1
2.5 TABLET ORAL DAILY
Qty: 30 TABLET | Refills: 5 | Status: SHIPPED | OUTPATIENT
Start: 2018-09-03 | End: 2018-09-03

## 2018-09-03 RX ORDER — ROSUVASTATIN CALCIUM 20 MG/1
20 TABLET, COATED ORAL
Qty: 30 TABLET | Refills: 5 | Status: SHIPPED | OUTPATIENT
Start: 2018-09-03 | End: 2018-12-03 | Stop reason: SDUPTHER

## 2018-09-03 RX ORDER — ASPIRIN 81 MG/1
81 TABLET, CHEWABLE ORAL DAILY
Qty: 30 TABLET | Refills: 5 | Status: SHIPPED | OUTPATIENT
Start: 2018-09-03 | End: 2018-09-03

## 2018-09-03 RX ORDER — ROSUVASTATIN CALCIUM 5 MG/1
20 TABLET, COATED ORAL
Status: DISCONTINUED | OUTPATIENT
Start: 2018-09-03 | End: 2018-09-03 | Stop reason: HOSPADM

## 2018-09-03 RX ORDER — METOPROLOL TARTRATE 50 MG/1
50 TABLET, FILM COATED ORAL EVERY 12 HOURS SCHEDULED
Qty: 60 TABLET | Refills: 5 | Status: SHIPPED | OUTPATIENT
Start: 2018-09-03 | End: 2018-09-19 | Stop reason: CLARIF

## 2018-09-03 RX ORDER — LISINOPRIL 2.5 MG/1
2.5 TABLET ORAL DAILY
Qty: 30 TABLET | Refills: 5 | Status: SHIPPED | OUTPATIENT
Start: 2018-09-03 | End: 2018-09-19 | Stop reason: SDUPTHER

## 2018-09-03 RX ADMIN — ASPIRIN 81 MG 81 MG: 81 TABLET ORAL at 09:35

## 2018-09-03 RX ADMIN — LISINOPRIL 2.5 MG: 2.5 TABLET ORAL at 09:35

## 2018-09-03 RX ADMIN — ENOXAPARIN SODIUM 40 MG: 40 INJECTION SUBCUTANEOUS at 09:35

## 2018-09-03 RX ADMIN — TICAGRELOR 90 MG: 90 TABLET ORAL at 09:35

## 2018-09-03 NOTE — DISCHARGE SUMMARY
Discharge Summary    Cassy Porter 47 y o  female MRN: [id number]    Unit/Bed#: E4 -01 Encounter: [id number]    Admission Date: 9/1/2018   Discharge Date:       Admitting Diagnosis: Chest pain [R07 9]  Acute coronary syndrome ([facility] [address] ) [I24 9]  NSTEMI (non-ST elevated myocardial infarction) [facility]) [I21 4]  Discharge Diagnosis: Principal Problem:    NSTEMI (non-ST elevated myocardial infarction) ([facility] [address] )      Condition at Discharge: good   Disposition: Home  Planned Readmission: No    HPI/Reason For Admission:     80-year-old female with tobacco abuse alcohol abuse presented with chief complaint of chest pain-her troponin was elevated on admission and she had a non ST elevation myocardial infarction  She was taken to the cardiac catheterization laboratory and 2 drug-eluting stents to the mid LAD were placed with good result  She was pain-free afterwards  She had an echocardiogram which showed left ventricular dysfunction of 20/25%-probable takotsubo cardiomyopathy  She was asymptomatic on the day of discharge and stable for discharge  The benefits of a life vest to protect against malignant ventricular arrhythmias given her left ventricular dysfunction was discussed and it was discussed that this could not be made available today given the holiday weekend  She declined waiting until tomorrow for life vest set up an approval-she understands the risks of doing so  Discussed tobacco and alcohol cessation  Discussed importance of taking aspirin and Brilinta every day and risk of stent thrombosis if not taken every day      Hospital Course:  As above    Procedures Performed:   Orders Placed This Encounter   Procedures    Cardiac catheterization    Critical Care    ED ECG Documentation Only    ED ECG Documentation Only       Complications:  None    Other Significant Findings/Treatment:  Not      Discharge Medications:  See after visit summary for reconciled discharge medications provided to patient and family  Discharge instructions/Information to patient and family:   See after visit summary for information provided to patient and family  Provisions for Follow-Up Care:  See after visit summary for information related to follow-up care and any pertinent home health orders  Discharge Statement   I had direct contact with the patient on the day of discharge  I spent 30 minutes discharging the patient  This time was spent on the day of discharge including: education, examination, paperwork  All questions were answered to patient satisfaction

## 2018-09-04 NOTE — CASE MANAGEMENT
Notification of Inpatient Admission/Inpatient Authorization Request  This is a Notification of Inpatient Admission/Request for Inpatient Authorization for our facility [address] [place] [address]  Please be advised that this patient is currently in our facility under Inpatient Status  Below you will find the Attending Physician and Facilitys information including NPI#  and contact information for the Utilization Review Department where the patient is receiving care services  Place of Service Code: 24   Place of Service Name: Inpatient Hospital  Presentation Date & Time: 9/1/2018  8:48 AM  Inpatient Admission Date & Time: 9/1/18 0950  Discharge Date & Time: 9/3/2018 10:10 AM   Discharge Disposition (if discharged): Home/Self Care  Attending Physician:   Racquel Dominguez  NPI: [id number]  Admission Orders:   Admission Orders     Ordered        09/01/18 0950  Inpatient Admission (expected length of stay for this patient is greater than two midnights)  Once               Facility: [address] [place] [address]  Address: Brenda Gandara, [address] [facility]  Phone: [phone] Tax ID: [id number]  NPI: [id number]  Medicare ID: [id number]    Thank you,  [address] [place] [address] Utilization Review Department  Phone: [phone]; Fax [phone]  ATTENTION: Please call with any questions or concerns to [phone]  and carefully follow the prompts so that you are directed to the right person  Send all requests for admission clinical reviews, approved or denied determinations and any other requests to fax [phone]   All voicemails are confidential     Mary Arguello RN Registered Nurse Signed   Case Management Date of Service: 9/2/2018  9:26 AM         []Hide copied text  Initial Clinical Review     Admission: Date/Time/Statement: 9/1/18 @ 0950            Orders Placed This Encounter   Procedures    Inpatient Admission (expected length of stay for this patient is greater than two midnights)       Standing Status:   Standing       Number of Occurrences:   1       Order Specific Question:   Admitting Physician       Answer:   Laurie Andrea       Order Specific Question:   Level of Care       Answer:   Critical Care [15]       Order Specific Question:   Estimated length of stay       Answer:   More than 2 Midnights       Order Specific Question:   Certification       Answer:   I certify that inpatient services are medically necessary for this patient for a duration of greater than two midnights  See H&P and MD Progress Notes for additional information about the patient's course of treatment             ED: Date/Time/Mode of Arrival:             ED Arrival Information      Expected Arrival Acuity Means of Arrival Escorted By Service Admission Type     - 9/1/2018 08:42 Emergent Wheelchair Family Member Cardiology Emergency     Arrival Complaint     Chest pain             Chief Complaint:        Chief Complaint   Patient presents with    Chest Pain       pt was sitting at the table this morning and got up and developed sudden onset squeezing pain in the center of her chest   states she got sweaty and vomited   gave her a puff of his inhaler  states still having squeezing pain    denies cardiac hx           History of Illness: Cassy Porter is a 52 y o  female with only past medical history including tobacco abuse that had acute onset of substernal chest pain this morning which felt like is somebody was sitting on chest and was associated with nausea, vomiting, diaphoresis      She has not had this pain before      She has had 2 nitroglycerin with improvement of the pain but the pain is still a 5/10      ED Vital Signs:            ED Triage Vitals [09/01/18 0849]   Temperature Pulse Respirations Blood Pressure SpO2   98 3 °F (36 8 °C) 84 18 (!) 154/113 97 %       Temp Source Heart Rate Source Patient Position - Orthostatic VS BP Location FiO2 (%)   Oral -- -- -- --     Pain Score           6                Wt Readings from Last 1 Encounters:   09/01/18 77 5 kg (170 lb 13 7 oz)         Vital Signs (abnormal):    above     Abnormal Labs/Diagnostic Test Results:    Troponin    0 16  CXR:  NAD     ED Treatment:   Medication Administration from 09/01/2018 0842 to 09/01/2018 1150        Date/Time Order Dose Route Action Action by Comments       09/01/2018 0919 nitroglycerin (NITROSTAT) SL tablet 0 4 mg 0 4 mg Sublingual Given Shin Devine RN         09/01/2018 0916 aspirin chewable tablet 324 mg 324 mg Oral Given Shin Devine RN         09/01/2018 0913 ondansetron (ZOFRAN) injection 4 mg 4 mg Intravenous Given Shin Devine RN         09/01/2018 0932 nitroglycerin (NITROSTAT) SL tablet 0 4 mg 0 4 mg Sublingual Given Shin Devine RN         09/01/2018 1004 heparin (porcine) injection 4,000 Units 4,000 Units Intravenous Given Shin Devine RN         09/01/2018 1002 ticagrelor (BRILINTA) tablet 180 mg 180 mg Oral Given Shin Devine RN         09/01/2018 1122 nitroGLYcerin (TRIDIL) 50 mg in 250 mL infusion (premix) 0 mcg/min Intravenous Stopped Charlie Tsang RN         09/01/2018 1009 nitroGLYcerin (TRIDIL) 50 mg in 250 mL infusion (premix) 10 mcg/min Intravenous New Bag Shin Devine RN double RN check completed with Roberto Montalvo RN and GENE Pabon RN       09/01/2018 1116 midazolam (VERSED) injection 1 mg Intravenous Given Addie Vences RN         09/01/2018 1110 midazolam (VERSED) injection 1 mg Intravenous Given Addie Vences RN         09/01/2018 1046 midazolam (VERSED) injection 2 mg Intravenous Given Addie Vences RN         09/01/2018 1115 fentanyl citrate (PF) 100 MCG/2ML 25 mcg Intravenous Given Addie Vences RN         09/01/2018 1110 fentanyl citrate (PF) 100 MCG/2ML 25 mcg Intravenous Given Addie Vences RN         09/01/2018 1046 fentanyl citrate (PF) 100 MCG/2ML 50 mcg Intravenous Given Addie Vences RN         09/01/2018 1051 lidocaine (XYLOCAINE) 1 % injection 10 mL Infiltration Given Bárbara Vega MD         09/01/2018 1052 sodium chloride 0 9 % infusion 100 mL/hr Intravenous New Bag Mary Beth Flood RN         09/01/2018 1059 bivalirudin (ANGIOMAX) bolus from bag 10 5 mL Intravenous Given Suzanna Iraheta RN         09/01/2018 1128 Bivalirudin Trifluoroacetate (ANGIOMAX) 250 mg in sodium chloride 0 9 % 50 mL infusion 0 mg/kg/hr Intravenous Stopped Jamila Luna RN         09/01/2018 1059 Bivalirudin Trifluoroacetate (ANGIOMAX) 250 mg in sodium chloride 0 9 % 50 mL infusion 1 75 mg/kg/hr Intravenous New Bag Suzanna Iraheta RN         09/01/2018 1115 nitroGLYcerin (TRIDIL) 50 mg in 250 mL infusion (premix) 200 mcg Other Given Bárbara Vega MD         09/01/2018 1108 nitroGLYcerin (TRIDIL) 50 mg in 250 mL infusion (premix) 200 mcg Other Given Bárbara Vega MD         09/01/2018 1128 iohexol (OMNIPAQUE) 350 MG/ML injection (SINGLE-DOSE) 200 mL Intravenous Given Bárbara Vega MD IA             Past Medical/Surgical History: Active Ambulatory Problems     Diagnosis Date Noted    No Active Ambulatory Problems           Resolved Ambulatory Problems     Diagnosis Date Noted    No Resolved Ambulatory Problems      No Additional Past Medical History         Admitting Diagnosis: Chest pain [R07 9]  Acute coronary syndrome (HCC) [I24 9]  NSTEMI (non-ST elevated myocardial infarction) ([facility] [address] ) [I21 4]     Age/Sex: 47 y o  female     Assessment/Plan:   Acute non ST elevation myocardial infarction with ongoing chest pain  2   Tobacco abuse  3   Alcohol abuse       Discussed with cath lab-given ongoing pain and subtle ST elevations would proceed with urgent cardiac catheterization      Brilinta load and IV heparin   IV nitroglycerin      Discussed with patient and partner risks including risk of bleeding, infection, nerve damage, ventricular arrhythmias, stroke, heart attack, death, dissection, and dye reaction      Patient and partner wish to proceed      Will observe for alcohol withdrawal          Admission Orders:   IP  9/1  @   0950  Scheduled Meds:   Current Facility-Administered Medications:  acetaminophen 650 mg Oral Q6H PRN Eric Hobbs MD   aspirin 81 mg Oral Daily Eric Hobbs MD   atorvastatin 80 mg Oral Daily With Aziza Martinez MD   enoxaparin 40 mg Subcutaneous BID Eric Hobbs MD   enoxaparin 40 mg Subcutaneous Q24H [address] Eric Hobbs MD   metoprolol tartrate 50 mg Oral Q12H [address] Eric Hobbs MD   nicotine 21 mg Transdermal Daily Eric Hobbs MD   ondansetron 4 mg Intravenous Q4H PRN SARAH Mayen   sodium chloride (PF) 3 mL Intravenous PRN Raul Brand MD   ticagrelor 90 mg Oral Q12H [address] Raul Brand MD      Continuous Infusions:    PRN Meds:   acetaminophen    ondansetron    Insert peripheral IV **AND** sodium chloride (PF)      Cardiac  Diet  2 DE     CARDIAC  CATH  9/1  Non ST segment elevation MI type 1 with ongoing chest discomfort      Findings:  High-grade mid LAD disease with 95% obstruction   40% obstruction more distal distally   Nonobstructive disease in the left main circumflex and right coronary artery      Procedure:  Drug-eluting stent placement x2 in the mid LAD with 2 25 Xience MIKE 23 mm and 8 mm with post dilatation by 2 25 x 15 NC balloon   Excellent result   Angiomax, Brilinta and aspirin used during procedure      Recommendations:  Dual anti-platelet therapy for 1 year   Check 2D echocardiogram for left ventricular systolic function   Beta-blocker as tolerated   Smoking cessation   Statin      PROGRESS  NOTE  9/2  Acute non ST elevation myocardial infarction status post drug-eluting stenting x2 in the mid left anterior descending with excellent result  2   Tobacco abuse  3   Alcohol abuse     Discussion/Recommendations:     Continue beta-blocker/aspirin/Brilinta/statin      Check echocardiogram      Tobacco cessation      Thank you,  [address] [facility]  Network Utilization Review Department  Phone: [phone]; Fax [phone]  ATTENTION: Please call with any questions or concerns to [phone]  and carefully follow the prompts so that you are directed to the right person  Send all requests for admission clinical reviews, approved or denied determinations and any other requests to fax [phone]  All voicemails are confidential                Notification of Discharge  This is a Notification of Discharge from our facility [address]  Please be advised that this patient has been discharge from our facility  Below you will find the admission and discharge date and time including the patients disposition  PRESENTATION DATE: 9/1/2018  8:48 AM  IP ADMISSION DATE: 9/1/18 0950  DISCHARGE DATE: 9/3/2018 10:10 AM  DISPOSITION: Home/Self Care    [address] [facility] in the [facility] Utilization Review Department  Phone: [phone]; Fax [phone]  ATTENTION: The Network Utilization Review Department is now centralized for our 9 Facilities  Make a note that we have a new phone and fax numbers for our Department  Please call with any questions or concerns to [phone] and carefully follow the prompts so that you are directed to the right person  All voicemails are confidential  Fax any determinations, approvals, denials, and requests for initial or continue stay review clinical to [phone]  Due to HIGH CALL volume, it would be easier if you could please send faxed requests to expedite your requests and in part, help us provide discharge notifications faster

## 2018-09-11 RX ORDER — IBUPROFEN 600 MG/1
600 TABLET ORAL EVERY 6 HOURS
COMMUNITY
Start: 2018-05-09 | End: 2018-09-19 | Stop reason: CLARIF

## 2018-09-19 ENCOUNTER — OFFICE VISIT (OUTPATIENT)
Dept: CARDIOLOGY CLINIC | Facility: CLINIC | Age: 53
End: 2018-09-19
Payer: COMMERCIAL

## 2018-09-19 VITALS
RESPIRATION RATE: 16 BRPM | BODY MASS INDEX: 30.12 KG/M2 | HEART RATE: 72 BPM | WEIGHT: 170 LBS | SYSTOLIC BLOOD PRESSURE: 102 MMHG | HEIGHT: 63 IN | DIASTOLIC BLOOD PRESSURE: 60 MMHG

## 2018-09-19 DIAGNOSIS — Z72.0 TOBACCO ABUSE: ICD-10-CM

## 2018-09-19 DIAGNOSIS — I25.10 CORONARY ARTERY DISEASE INVOLVING NATIVE CORONARY ARTERY OF NATIVE HEART WITHOUT ANGINA PECTORIS: ICD-10-CM

## 2018-09-19 DIAGNOSIS — F10.10 ETOH ABUSE: ICD-10-CM

## 2018-09-19 DIAGNOSIS — Z95.5 S/P DRUG ELUTING CORONARY STENT PLACEMENT: ICD-10-CM

## 2018-09-19 DIAGNOSIS — I51.81 TAKOTSUBO CARDIOMYOPATHY: Primary | ICD-10-CM

## 2018-09-19 DIAGNOSIS — I21.4 NSTEMI (NON-ST ELEVATED MYOCARDIAL INFARCTION) (HCC): ICD-10-CM

## 2018-09-19 PROCEDURE — 99215 OFFICE O/P EST HI 40 MIN: CPT | Performed by: NURSE PRACTITIONER

## 2018-09-19 RX ORDER — LISINOPRIL 2.5 MG/1
5 TABLET ORAL DAILY
Qty: 30 TABLET | Refills: 0
Start: 2018-09-19 | End: 2018-12-03 | Stop reason: SDUPTHER

## 2018-09-19 RX ORDER — METOPROLOL SUCCINATE 25 MG/1
50 TABLET, EXTENDED RELEASE ORAL EVERY 12 HOURS
Qty: 180 TABLET | Refills: 3 | Status: SHIPPED | OUTPATIENT
Start: 2018-09-19 | End: 2018-09-27

## 2018-09-19 RX ORDER — NITROGLYCERIN 0.4 MG/1
0.4 TABLET SUBLINGUAL
Qty: 90 TABLET | Refills: 0 | Status: SHIPPED | OUTPATIENT
Start: 2018-09-19 | End: 2018-09-20 | Stop reason: SDUPTHER

## 2018-09-19 RX ORDER — METOPROLOL SUCCINATE 25 MG/1
50 TABLET, EXTENDED RELEASE ORAL EVERY 12 HOURS
Qty: 60 TABLET | Refills: 3 | Status: SHIPPED | OUTPATIENT
Start: 2018-09-19 | End: 2018-09-19 | Stop reason: SDUPTHER

## 2018-09-19 NOTE — PROGRESS NOTES
Heart Failure Office Visit   Cassy Porter 47 y o  female MRN: [id number]    [facility]  Ms Ignacia Bradshaw is a 47year old female with a known history of tobacco abuse and  ETOH abuse  Ms Rufus Cranker [facility] with a NSTEMI  She presented to the ED with CP  Troponin elevated to 11 05  She was taken for a cardiac catheterization which showed Mid LAD: There was a 95 % stenosis  Distal LAD: There was a 40 % stenosis  Ostial circumflex: There was a 20 % stenosis  Distal circumflex: There was a 30 % stenosis  1st obtuse marginal: There was a 30 % stenosis  Proximal RCA: The vessel was mildly ectatic  Right PDA: There was a 30 % stenosis  95 %   MIKE Xience [address] stent x2   Performed on the 95% lesion in the mid LAD  She was placed on ASA, Brlinita, statin and BB  Lipid profile showed cholesterol 296, triglycerides 328, HDL 52,   She was encouraged in smoking cessation and ETOH abstinence  TTE showed LV markedly reduced  Ejection fraction 20-25%  Severe hypokinesis of the mid and apical segments  Takatsubo cardiomyopathy  Mild concentric hypertrophy  Benefits of Life Vest discussed prior to discharge  It was a Holiday weekend therefore Cassy would have to stay until after the weekend  She declined and was discharged home  Today Cassy presents to our office for a  recent hospitalization follow-up visit  She is accompanied by her   She denies chest pain palpitations lightheadedness dizziness dyspnea with minimal her moderate exertion  She is no longer smoking or drinking alcohol  She is taking all her medications as prescribed  She is finding it difficult to abide by 2 g sodium low fat low cholesterol diet    No recent lab studies      Patient Active Problem List   Diagnosis    NSTEMI (non-ST elevated myocardial infarction) ([facility] [address] )       ROS- negative        Objective:     Vitals: /64  Vitals:    09/19/18 0806   BP: 102/60   BP Location: Right arm   Patient Position: Sitting   Cuff Size: Standard   Pulse: 72   Resp: 16   Weight: 77 1 kg (170 lb)   Height: 5' 3" (1 6 m)     Body mass index is 30 11 kg/m²  Wt Readings from Last 3 Encounters:   09/19/18 77 1 kg (170 lb)   09/01/18 77 5 kg (170 lb 13 7 oz)         Physical Exam:  GEN: Cassy Porter appears well, alert and oriented x 3, pleasant and cooperative   HEENT: pupils equal, round, and reactive to light; extraocular muscles intact  NECK: supple, no carotid bruits   HEART: regular rhythm, normal S1 and S2, no murmurs, clicks, gallops or rubs, JVP is flat    LUNGS: clear to auscultation bilaterally; no wheezes, rales, or rhonchi   ABDOMEN: normal bowel sounds, soft, no tenderness, no distention  EXTREMITIES: peripheral pulses normal; no clubbing, cyanosis, or edema  NEURO: no focal findings   SKIN: normal without suspicious lesions on exposed skin, right distal femoral site with some eccyhmosis       Current Outpatient Prescriptions:     aspirin 81 mg chewable tablet, Chew 1 tablet (81 mg total) daily, Disp: 30 tablet, Rfl: 5    ibuprofen (MOTRIN) 600 mg tablet, Take 600 mg by mouth every 6 (six) hours, Disp: , Rfl:     lisinopril (ZESTRIL) 2 5 mg tablet, Take 1 tablet (2 5 mg total) by mouth daily, Disp: 30 tablet, Rfl: 5    metoprolol tartrate (LOPRESSOR) 50 mg tablet, Take 1 tablet (50 mg total) by mouth every 12 (twelve) hours, Disp: 60 tablet, Rfl: 5    rosuvastatin (CRESTOR) 20 MG tablet, Take 1 tablet (20 mg total) by mouth daily with dinner, Disp: 30 tablet, Rfl: 5    ticagrelor (BRILINTA) 90 MG, Take 1 tablet (90 mg total) by mouth every 12 (twelve) hours, Disp: 60 tablet, Rfl: 5      Labs & Results:                Assessment/Plan:  1  NSTEMI  2  SP MIKE X2 to mid LAD- DO not Stop ASA or Brilinta for any reason, continue on Metoprolol succinate 50mg Q12 hours, SL NTG ordered with instructions on the proper use  3  CAD residual non obstructing-Ostial circumflex:  There was a 20 % stenosis  Distal circumflex: There was a 30 % stenosis  1st obtuse marginal: There was a 30 % stenosis  Proximal RCA: The vessel was mildly ectatic  Right PDA: There was a 30 % stenosis  4  Hypercholesterolemia- continue on statin, follow up Lipid panel in 3 months  5  Takasubo CM LVEF 20-25%- Agreeable to [facility]   [facility] representative notified  They will go to [facility] for fitting  Continue on Metoprolol succinate 50mg Q12 hours and Lisinopril 5mg daily  Educated on SS of HF  Re enforced a 2gm sodium diet 2 liter fluid restriction, daily weights and to call the office if experiencing any symptoms of HF  Follow up TTE to be determined after the next office visit in one month  6  Tobacco abuse- not using Nitro patch continues with smoking cessation  7  ETOH abuse- denies further use  CBC, BMP and TSH to be done in near future  Follow up in our office in one month        SARAH Mabry  9/19/2018  8:38 AM

## 2018-09-19 NOTE — PATIENT INSTRUCTIONS
Maintain a 2 gram daily sodium diet and 1500 ml daily fluid restriction  Check daily weights  If you gain 3 pounds in one day, 5 pounds in one week, or experience worsening shortness of breath or increasing lower leg swelling  Please call the heart failure office at [phone]    Please bring a  list of your current medications and daily weights to the office visit    Do not stop ASA or Brilinta for any reason    Low fat low cholesterol diet, eating fresh is best

## 2018-09-20 DIAGNOSIS — I21.4 NSTEMI (NON-ST ELEVATED MYOCARDIAL INFARCTION) (HCC): ICD-10-CM

## 2018-09-20 DIAGNOSIS — I51.81 TAKOTSUBO CARDIOMYOPATHY: ICD-10-CM

## 2018-09-20 PROBLEM — F10.10 ETOH ABUSE: Status: ACTIVE | Noted: 2018-09-20

## 2018-09-20 PROBLEM — I25.10 CORONARY ARTERY DISEASE INVOLVING NATIVE CORONARY ARTERY WITHOUT ANGINA PECTORIS: Status: ACTIVE | Noted: 2018-09-20

## 2018-09-20 RX ORDER — NITROGLYCERIN 0.4 MG/1
0.4 TABLET SUBLINGUAL
Qty: 25 TABLET | Refills: 3 | Status: SHIPPED | OUTPATIENT
Start: 2018-09-20

## 2018-09-27 ENCOUNTER — OFFICE VISIT (OUTPATIENT)
Dept: CARDIOLOGY CLINIC | Facility: CLINIC | Age: 53
End: 2018-09-27
Payer: COMMERCIAL

## 2018-09-27 VITALS
BODY MASS INDEX: 30.37 KG/M2 | WEIGHT: 171.4 LBS | HEIGHT: 63 IN | RESPIRATION RATE: 16 BRPM | DIASTOLIC BLOOD PRESSURE: 72 MMHG | HEART RATE: 60 BPM | SYSTOLIC BLOOD PRESSURE: 124 MMHG

## 2018-09-27 DIAGNOSIS — I25.10 CORONARY ARTERY DISEASE INVOLVING NATIVE HEART WITHOUT ANGINA PECTORIS, UNSPECIFIED VESSEL OR LESION TYPE: Primary | ICD-10-CM

## 2018-09-27 PROCEDURE — 99213 OFFICE O/P EST LOW 20 MIN: CPT | Performed by: INTERNAL MEDICINE

## 2018-09-27 RX ORDER — METOPROLOL SUCCINATE 50 MG/1
TABLET, EXTENDED RELEASE ORAL
COMMUNITY
Start: 2018-09-19 | End: 2018-12-03 | Stop reason: SDUPTHER

## 2018-09-27 NOTE — PROGRESS NOTES
[address] Cardiology [place]  [address]  [facility] [facility]  [phone]    Cardiology Follow up    Patient:  Cassy Porter  :  1965  MRN:  [id number]    History of Present Illness:     36-year-old female with past medical history of former tobacco use and coronary disease status post non ST elevation myocardial infarction in 2018 with mid LAD drug-eluting stenting and residual distal LAD 40%, circumflex 20 is 30%, obtuse marginal 30%, and right posterior descending artery of 30%, left ventricular dysfunction that might be takotsubo cardiomyopathy, presents for hospital follow-up  She has been doing relatively well since her she left the hospital from her myocardial infarction  She denies any chest pain, shortness of breath, palpitations, dizziness, or syncope  She does note a cough-she did have a cough before the myocardial infarction-this cough seems a bit different only occurs in the morning and then goes away  She attributes to the dust and cat dander that is in her house  She has thankfully quit smoking and drinking  Patient Active Problem List   Diagnosis    NSTEMI (non-ST elevated myocardial infarction) ([facility] [address] )    ETOH abuse    Coronary artery disease involving native coronary artery without angina pectoris       Past Surgical History  Past Surgical History:   Procedure Laterality Date    KNEE SURGERY Right        Social History   Social History     Social History    Marital status: Single     Spouse name: N/A    Number of children: N/A    Years of education: N/A     Occupational History    Not on file       Social History Main Topics    Smoking status: Current Every Day Smoker     Packs/day: 0 50     Types: Cigarettes    Smokeless tobacco: Never Used    Alcohol use Yes      Comment: lately drinking about 3 drinks per day after work   Syd Díaz Drug use: No    Sexual activity: Not on file     Other Topics Concern    Not on file     Social History Narrative    No narrative on file        Allergies   Allergen Reactions    Codeine GI Intolerance    Nuts Rash       Family History   No family history on file  Review of Systems:  Review of Systems   Constitutional: Negative for chills, fatigue and fever  HENT: Negative for hearing loss, nosebleeds and tinnitus  Eyes: Negative for pain  Respiratory: Positive for cough  Negative for chest tightness and shortness of breath  Cardiovascular: Negative for chest pain, palpitations and leg swelling  Gastrointestinal: Negative for abdominal pain, nausea and vomiting  Endocrine: Negative for cold intolerance and heat intolerance  Genitourinary: Negative for difficulty urinating, hematuria and vaginal bleeding  Musculoskeletal: Negative for arthralgias  Skin: Negative for rash  Allergic/Immunologic: Negative for environmental allergies  Neurological: Negative for dizziness, syncope, weakness and light-headedness  Psychiatric/Behavioral: Negative for decreased concentration and sleep disturbance  The patient is not nervous/anxious            Current Outpatient Prescriptions:     aspirin 81 mg chewable tablet, Chew 1 tablet (81 mg total) daily, Disp: 30 tablet, Rfl: 5    lisinopril (ZESTRIL) 2 5 mg tablet, Take 2 tablets (5 mg total) by mouth daily, Disp: 30 tablet, Rfl: 0    metoprolol succinate (TOPROL-XL) 50 mg 24 hr tablet, , Disp: , Rfl:     nitroglycerin (NITROSTAT) 0 4 mg SL tablet, Place 1 tablet (0 4 mg total) under the tongue every 5 (five) minutes as needed for chest pain, Disp: 25 tablet, Rfl: 3    rosuvastatin (CRESTOR) 20 MG tablet, Take 1 tablet (20 mg total) by mouth daily with dinner, Disp: 30 tablet, Rfl: 5    ticagrelor (BRILINTA) 90 MG, Take 1 tablet (90 mg total) by mouth every 12 (twelve) hours, Disp: 60 tablet, Rfl: 5     Physical Exam:    Vitals:    09/27/18 0813   Resp: 16   Weight: 77 7 kg (171 lb 6 4 oz)   Height: 5' 3" (1 6 m)       Physical Exam   Constitutional: She is oriented to person, place, and time  She appears well-developed and well-nourished  HENT:   Head: Normocephalic  Right Ear: External ear normal    Left Ear: External ear normal    Mouth/Throat: Oropharynx is clear and moist    Eyes: Pupils are equal, round, and reactive to light  Neck: No JVD present  Carotid bruit is not present  Cardiovascular: Normal rate, regular rhythm and intact distal pulses  Exam reveals no gallop and no friction rub  No murmur heard  Pulmonary/Chest: Effort normal and breath sounds normal  No tachypnea  No respiratory distress  She has no wheezes  She has no rales  She exhibits no tenderness  Abdominal: Soft  She exhibits no distension  There is no tenderness  There is no rebound and no guarding  Musculoskeletal: She exhibits no edema  Neurological: She is alert and oriented to person, place, and time  Skin: Skin is warm and dry  Psychiatric: She has a normal mood and affect  Her behavior is normal  Judgment and thought content normal    Nursing note and vitals reviewed  Labs:not applicable    Assessment/Plan:    1  Coronary disease status post non ST elevation myocardial infarction and possible takotsubo cardiomyopathy with left ventricular dysfunction-she continues on the beta-blocker and ACE-inhibitor  She was congratulated on her lifestyle changes of quitting smoking and drinking  I would like to get an echocardiogram with the next visit  I would like to check fasting lipids before the next visit as well in 1 month  Thank you so much, please do not hesitate to contact me if there any questions or concerns        Rashard Starr MD  9/27/2018  8:16 AM

## 2018-10-08 ENCOUNTER — DOCUMENTATION (OUTPATIENT)
Dept: CARDIOLOGY CLINIC | Facility: CLINIC | Age: 53
End: 2018-10-08

## 2018-11-08 ENCOUNTER — HOSPITAL ENCOUNTER (OUTPATIENT)
Dept: NON INVASIVE DIAGNOSTICS | Facility: CLINIC | Age: 53
Discharge: HOME | End: 2018-11-08
Payer: COMMERCIAL

## 2018-11-08 DIAGNOSIS — I25.10 CORONARY ARTERY DISEASE INVOLVING NATIVE HEART WITHOUT ANGINA PECTORIS, UNSPECIFIED VESSEL OR LESION TYPE: ICD-10-CM

## 2018-11-08 PROCEDURE — 93308 TTE F-UP OR LMTD: CPT

## 2018-11-08 PROCEDURE — 93325 DOPPLER ECHO COLOR FLOW MAPG: CPT | Performed by: INTERNAL MEDICINE

## 2018-11-08 PROCEDURE — 93308 TTE F-UP OR LMTD: CPT | Performed by: INTERNAL MEDICINE

## 2018-11-08 PROCEDURE — 93321 DOPPLER ECHO F-UP/LMTD STD: CPT | Performed by: INTERNAL MEDICINE

## 2018-11-30 ENCOUNTER — TELEPHONE (OUTPATIENT)
Dept: CARDIOLOGY CLINIC | Facility: CLINIC | Age: 53
End: 2018-11-30

## 2018-11-30 NOTE — TELEPHONE ENCOUNTER
Oh Maciej she should be taking 5 mg of the lisinopril, the metoprolol as she has been taking is ok  Could you please refills these and the brilinta? Thank you      Keerthi Serna

## 2018-12-03 DIAGNOSIS — I21.4 NSTEMI (NON-ST ELEVATED MYOCARDIAL INFARCTION) (HCC): ICD-10-CM

## 2018-12-03 RX ORDER — METOPROLOL SUCCINATE 50 MG/1
50 TABLET, EXTENDED RELEASE ORAL DAILY
Qty: 90 TABLET | Refills: 2 | Status: SHIPPED | OUTPATIENT
Start: 2018-12-03

## 2018-12-03 RX ORDER — ROSUVASTATIN CALCIUM 20 MG/1
20 TABLET, COATED ORAL
Qty: 90 TABLET | Refills: 2 | Status: SHIPPED | OUTPATIENT
Start: 2018-12-03

## 2018-12-03 RX ORDER — LISINOPRIL 5 MG/1
5 TABLET ORAL DAILY
Qty: 90 TABLET | Refills: 2 | Status: SHIPPED | OUTPATIENT
Start: 2018-12-03

## 2019-08-26 ENCOUNTER — TELEPHONE (OUTPATIENT)
Dept: CARDIOLOGY CLINIC | Facility: CLINIC | Age: 54
End: 2019-08-26

## 2019-08-26 NOTE — TELEPHONE ENCOUNTER
Received a fax request from [facility] cardiology records  Consent for Release form provided and signed      Information faxed to [phone]

## 2019-08-30 DIAGNOSIS — I21.4 NSTEMI (NON-ST ELEVATED MYOCARDIAL INFARCTION) (HCC): ICD-10-CM

## 2019-08-30 RX ORDER — TICAGRELOR 90 MG/1
TABLET ORAL
Qty: 180 TABLET | Refills: 5 | Status: SHIPPED | OUTPATIENT
Start: 2019-08-30

## 2023-06-01 ENCOUNTER — OFFICE VISIT (OUTPATIENT)
Dept: FAMILY MEDICINE CLINIC | Facility: CLINIC | Age: 58
End: 2023-06-01
Payer: COMMERCIAL

## 2023-06-01 VITALS
HEIGHT: 62 IN | SYSTOLIC BLOOD PRESSURE: 122 MMHG | BODY MASS INDEX: 27.23 KG/M2 | WEIGHT: 148 LBS | TEMPERATURE: 98.5 F | OXYGEN SATURATION: 98 % | HEART RATE: 79 BPM | DIASTOLIC BLOOD PRESSURE: 82 MMHG

## 2023-06-01 DIAGNOSIS — G89.29 CHRONIC PAIN OF LEFT KNEE: Primary | ICD-10-CM

## 2023-06-01 DIAGNOSIS — Z12.11 COLON CANCER SCREENING: ICD-10-CM

## 2023-06-01 DIAGNOSIS — I25.10 CORONARY ARTERY DISEASE INVOLVING NATIVE CORONARY ARTERY OF NATIVE HEART WITHOUT ANGINA PECTORIS: ICD-10-CM

## 2023-06-01 DIAGNOSIS — M25.562 CHRONIC PAIN OF LEFT KNEE: Primary | ICD-10-CM

## 2023-06-01 PROBLEM — Z72.0 TOBACCO ABUSE: Status: ACTIVE | Noted: 2019-08-28

## 2023-06-01 PROCEDURE — 99204 OFFICE O/P NEW MOD 45 MIN: CPT | Performed by: FAMILY MEDICINE

## 2023-06-01 RX ORDER — MELOXICAM 15 MG/1
15 TABLET ORAL DAILY PRN
Qty: 30 TABLET | Refills: 1 | Status: SHIPPED | OUTPATIENT
Start: 2023-06-01

## 2023-06-01 NOTE — PROGRESS NOTES
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  Will try Meloxicam   1  Chronic pain of left knee  -     meloxicam (MOBIC) 15 mg tablet; Take 1 tablet (15 mg total) by mouth daily as needed for moderate pain    2  Coronary artery disease involving native coronary artery of native heart without angina pectoris  -     Lipid Panel with Direct LDL reflex; Future; Expected date: 2023  -     Comprehensive metabolic panel; Future; Expected date: 2023  -     TSH, 3rd generation with Free T4 reflex; Future; Expected date: 2023  -     CBC and differential; Future; Expected date: 2023    3  Colon cancer screening  -     Cologuard    4  BMI 27 0-27 9,adult    Had a bad experience with mammogram   After get blood work results, may follow up with cardiology  Subjective      Chief Complaint   Patient presents with   • Establish Care     Patient refuses mammogram   Order placed for Cologuard   • Knee Pain     Left knee pain       Patient is here to establish   She has history of coronary artery disese  High cholesterol  No blood work since   Hasn't seen cardiology since 2019  Smoke 1/2 pack of cigs a day/ one glass of wine a day when not working  Works as med tech at [employer]  Drink plenty of water  Over the past year, her knee pain worsens  Left knee gets swollen by end of day   When she sits down at home and elevates swelling goes down, but at night she has significant discomfort "that she has to get up and walk  Been taking Ibuprofen 600 mg BID  Been working as a CNA since [facility]  Patient weighed over 180 when she had her stents  Knee Pain   The incident occurred more than 1 week ago  There was no injury mechanism  The pain is present in the left knee  Pain scale: yesterday 10/10  Review of Systems   Constitutional: Negative for chills and fever  HENT: Negative for congestion and sore throat  Respiratory: Negative for chest tightness  Cardiovascular: Negative for chest pain and palpitations  Gastrointestinal: Negative for abdominal pain, constipation, diarrhea and nausea  Genitourinary: Negative for difficulty urinating  Musculoskeletal: Positive for arthralgias  Skin: Negative  Neurological: Negative for dizziness and headaches  Psychiatric/Behavioral: Negative  No current outpatient medications on file prior to visit  Objective     /82 (BP Location: Right arm, Patient Position: Sitting, Cuff Size: Adult)   Pulse 79   Temp 98 5 °F (36 9 °C)   Ht 5' 2\" (1 575 m)   Wt 67 1 kg (148 lb)   SpO2 98%   BMI 27 07 kg/m²     Physical Exam  Vitals and nursing note reviewed  Constitutional:       General: She is not in acute distress  HENT:      Head: Normocephalic  Right Ear: Tympanic membrane normal       Left Ear: Tympanic membrane normal       Mouth/Throat:      Pharynx: No posterior oropharyngeal erythema  Eyes:      General: No scleral icterus  Neck:      Thyroid: No thyromegaly  Vascular: No carotid bruit  Cardiovascular:      Rate and Rhythm: Normal rate and regular rhythm  Heart sounds: Normal heart sounds  Pulmonary:      Effort: Pulmonary effort is normal       Breath sounds: Normal breath sounds  Musculoskeletal:         General: Swelling (left knee) present  Right lower leg: No edema  Left lower leg: No edema  Lymphadenopathy:      Cervical: No cervical adenopathy     Skin:     General: Skin is warm " and dry  Neurological:      Mental Status: She is alert and oriented to person, place, and time     Psychiatric:         Mood and Affect: Mood normal        Doyal Lines, DO

## 2023-06-01 NOTE — PATIENT INSTRUCTIONS
Give Meloxicam a week and let me know how knee pain is doing  Hopefully better than Ibuprofen  If not, I may prescribe Gabapentin

## 2023-06-05 ENCOUNTER — APPOINTMENT (OUTPATIENT)
Dept: LAB | Facility: CLINIC | Age: 58
End: 2023-06-05
Payer: COMMERCIAL

## 2023-06-05 DIAGNOSIS — I25.10 CORONARY ARTERY DISEASE INVOLVING NATIVE CORONARY ARTERY OF NATIVE HEART WITHOUT ANGINA PECTORIS: ICD-10-CM

## 2023-06-05 LAB
ALBUMIN SERPL BCP-MCNC: 3.7 G/DL (ref 3.5–5)
ALP SERPL-CCNC: 96 U/L (ref 46–116)
ALT SERPL W P-5'-P-CCNC: 22 U/L (ref 12–78)
ANION GAP SERPL CALCULATED.3IONS-SCNC: -1 MMOL/L (ref 4–13)
AST SERPL W P-5'-P-CCNC: 11 U/L (ref 5–45)
BASOPHILS # BLD AUTO: 0.07 THOUSANDS/ÂΜL (ref 0–0.1)
BASOPHILS NFR BLD AUTO: 1 % (ref 0–1)
BILIRUB SERPL-MCNC: 0.25 MG/DL (ref 0.2–1)
BUN SERPL-MCNC: 13 MG/DL (ref 5–25)
CALCIUM SERPL-MCNC: 9.8 MG/DL (ref 8.3–10.1)
CHLORIDE SERPL-SCNC: 106 MMOL/L (ref 96–108)
CHOLEST SERPL-MCNC: 292 MG/DL
CO2 SERPL-SCNC: 29 MMOL/L (ref 21–32)
CREAT SERPL-MCNC: 0.74 MG/DL (ref 0.6–1.3)
EOSINOPHIL # BLD AUTO: 0.23 THOUSAND/ÂΜL (ref 0–0.61)
EOSINOPHIL NFR BLD AUTO: 3 % (ref 0–6)
ERYTHROCYTE [DISTWIDTH] IN BLOOD BY AUTOMATED COUNT: 13.2 % (ref 11.6–15.1)
GFR SERPL CREATININE-BSD FRML MDRD: 90 ML/MIN/1.73SQ M
GLUCOSE P FAST SERPL-MCNC: 141 MG/DL (ref 65–99)
HCT VFR BLD AUTO: 43.9 % (ref 34.8–46.1)
HDLC SERPL-MCNC: 45 MG/DL
HGB BLD-MCNC: 14.9 G/DL (ref 11.5–15.4)
IMM GRANULOCYTES # BLD AUTO: 0.05 THOUSAND/UL (ref 0–0.2)
IMM GRANULOCYTES NFR BLD AUTO: 1 % (ref 0–2)
LDLC SERPL CALC-MCNC: 183 MG/DL (ref 0–100)
LYMPHOCYTES # BLD AUTO: 1.54 THOUSANDS/ÂΜL (ref 0.6–4.47)
LYMPHOCYTES NFR BLD AUTO: 19 % (ref 14–44)
MCH RBC QN AUTO: 33.9 PG (ref 26.8–34.3)
MCHC RBC AUTO-ENTMCNC: 33.9 G/DL (ref 31.4–37.4)
MCV RBC AUTO: 100 FL (ref 82–98)
MONOCYTES # BLD AUTO: 0.56 THOUSAND/ÂΜL (ref 0.17–1.22)
MONOCYTES NFR BLD AUTO: 7 % (ref 4–12)
NEUTROPHILS # BLD AUTO: 5.54 THOUSANDS/ÂΜL (ref 1.85–7.62)
NEUTS SEG NFR BLD AUTO: 69 % (ref 43–75)
NRBC BLD AUTO-RTO: 0 /100 WBCS
PLATELET # BLD AUTO: 212 THOUSANDS/UL (ref 149–390)
PMV BLD AUTO: 10.4 FL (ref 8.9–12.7)
POTASSIUM SERPL-SCNC: 4.5 MMOL/L (ref 3.5–5.3)
PROT SERPL-MCNC: 7.3 G/DL (ref 6.4–8.4)
RBC # BLD AUTO: 4.39 MILLION/UL (ref 3.81–5.12)
SODIUM SERPL-SCNC: 134 MMOL/L (ref 135–147)
TRIGL SERPL-MCNC: 320 MG/DL
TSH SERPL DL<=0.05 MIU/L-ACNC: 1.76 UIU/ML (ref 0.45–4.5)
WBC # BLD AUTO: 7.99 THOUSAND/UL (ref 4.31–10.16)

## 2023-06-05 PROCEDURE — 36415 COLL VENOUS BLD VENIPUNCTURE: CPT

## 2023-06-05 PROCEDURE — 84443 ASSAY THYROID STIM HORMONE: CPT

## 2023-06-05 PROCEDURE — 80061 LIPID PANEL: CPT

## 2023-06-05 PROCEDURE — 85025 COMPLETE CBC W/AUTO DIFF WBC: CPT

## 2023-06-05 PROCEDURE — 80053 COMPREHEN METABOLIC PANEL: CPT

## 2023-06-21 ENCOUNTER — TELEPHONE (OUTPATIENT)
Dept: FAMILY MEDICINE CLINIC | Facility: CLINIC | Age: 58
End: 2023-06-21

## 2023-06-21 NOTE — TELEPHONE ENCOUNTER
"\"Salbador, this is Jorge calling from [employer]  I'm calling in today regarding a Cologuard order that we had received from mutual patient from a doctor Eriberto Samayoa  We were reaching out here just because the order requisition that we'd received here electronically was missing a contact phone number for the patient  If someone could please call us back at eight, four, four, eight, seven, zero, eight, eight, seven, zero, and we can gather this information When you do call us back  Please reference this Cologuard case number  This number is C as in Sarahi  One, nine, two, nine  One, six, one, two, four  And that is C  One, nine, two, nine, One, six, one, two, four  And we can be reached at eight, four, four, eight, seven, zero, eight, eight, seven, zero  I do apologize as I'm unable to leave any identifying patient information over voicemail, but we'd be more than happy to provide any info you would need  When you do call us back again, just follow the prompts here to provide your support  Anyone here will be able to assist you  Just provide that Cologuard case number, provider name, Doctor Eriberto Samayoa and their NPI number  Thank you very much  Have a great day  \"    Updated [employer] on pt's contact #  "

## 2025-02-17 ENCOUNTER — TELEPHONE (OUTPATIENT)
Dept: FAMILY MEDICINE CLINIC | Facility: CLINIC | Age: 60
End: 2025-02-17

## 2025-02-17 NOTE — TELEPHONE ENCOUNTER
Pt Attribution #1    Called pt in regards to scheduling an appointment with Dr. Lind as she is due. Pt is due for her physical. Could not lvm as the phone stated that the phone was not in service. [employer] message sent.